# Patient Record
Sex: MALE | Race: WHITE | NOT HISPANIC OR LATINO | Employment: FULL TIME | URBAN - METROPOLITAN AREA
[De-identification: names, ages, dates, MRNs, and addresses within clinical notes are randomized per-mention and may not be internally consistent; named-entity substitution may affect disease eponyms.]

---

## 2017-01-10 RX ORDER — METOPROLOL TARTRATE 50 MG/1
TABLET, FILM COATED ORAL
Qty: 15 TABLET | Refills: 0 | Status: SHIPPED | OUTPATIENT
Start: 2017-01-10 | End: 2017-01-11 | Stop reason: SDUPTHER

## 2017-01-11 RX ORDER — METOPROLOL TARTRATE 50 MG/1
TABLET, FILM COATED ORAL
Qty: 15 TABLET | Refills: 0 | Status: SHIPPED | OUTPATIENT
Start: 2017-01-11 | End: 2017-10-23 | Stop reason: SDUPTHER

## 2017-10-23 ENCOUNTER — OFFICE VISIT (OUTPATIENT)
Dept: FAMILY MEDICINE CLINIC | Facility: CLINIC | Age: 52
End: 2017-10-23

## 2017-10-23 VITALS
HEART RATE: 83 BPM | WEIGHT: 225.6 LBS | OXYGEN SATURATION: 96 % | SYSTOLIC BLOOD PRESSURE: 124 MMHG | DIASTOLIC BLOOD PRESSURE: 82 MMHG | TEMPERATURE: 98.5 F | BODY MASS INDEX: 29.9 KG/M2 | HEIGHT: 73 IN

## 2017-10-23 DIAGNOSIS — Z00.00 ENCOUNTER FOR HEALTH MAINTENANCE EXAMINATION: Primary | ICD-10-CM

## 2017-10-23 DIAGNOSIS — Z13.220 SCREENING FOR LIPID DISORDERS: ICD-10-CM

## 2017-10-23 DIAGNOSIS — Z12.11 SCREEN FOR COLON CANCER: ICD-10-CM

## 2017-10-23 DIAGNOSIS — Z23 NEED FOR PNEUMOCOCCAL VACCINE: ICD-10-CM

## 2017-10-23 DIAGNOSIS — Z13.1 SCREENING FOR DIABETES MELLITUS: ICD-10-CM

## 2017-10-23 DIAGNOSIS — I49.9 IRREGULAR HEARTBEAT: ICD-10-CM

## 2017-10-23 LAB
ALBUMIN SERPL-MCNC: 4.4 G/DL (ref 3.5–5.2)
ALBUMIN/GLOB SERPL: 1.7 G/DL
ALP SERPL-CCNC: 67 U/L (ref 39–117)
ALT SERPL-CCNC: 20 U/L (ref 1–41)
AST SERPL-CCNC: 15 U/L (ref 1–40)
BILIRUB SERPL-MCNC: 0.3 MG/DL (ref 0.1–1.2)
BUN SERPL-MCNC: 14 MG/DL (ref 6–20)
BUN/CREAT SERPL: 11.8 (ref 7–25)
CALCIUM SERPL-MCNC: 9.7 MG/DL (ref 8.6–10.5)
CHLORIDE SERPL-SCNC: 103 MMOL/L (ref 98–107)
CHOLEST SERPL-MCNC: 184 MG/DL (ref 0–200)
CO2 SERPL-SCNC: 25.9 MMOL/L (ref 22–29)
CREAT SERPL-MCNC: 1.19 MG/DL (ref 0.76–1.27)
GFR SERPLBLD CREATININE-BSD FMLA CKD-EPI: 65 ML/MIN/1.73
GFR SERPLBLD CREATININE-BSD FMLA CKD-EPI: 78 ML/MIN/1.73
GLOBULIN SER CALC-MCNC: 2.6 GM/DL
GLUCOSE SERPL-MCNC: 96 MG/DL (ref 65–99)
HBA1C MFR BLD: 6 % (ref 4.8–5.6)
HDLC SERPL-MCNC: 29 MG/DL (ref 40–60)
LDLC SERPL CALC-MCNC: 116 MG/DL (ref 0–100)
POTASSIUM SERPL-SCNC: 4.3 MMOL/L (ref 3.5–5.2)
PROT SERPL-MCNC: 7 G/DL (ref 6–8.5)
SODIUM SERPL-SCNC: 142 MMOL/L (ref 136–145)
TRIGL SERPL-MCNC: 196 MG/DL (ref 0–150)
VLDLC SERPL CALC-MCNC: 39.2 MG/DL (ref 5–40)

## 2017-10-23 PROCEDURE — 90670 PCV13 VACCINE IM: CPT | Performed by: FAMILY MEDICINE

## 2017-10-23 PROCEDURE — 99396 PREV VISIT EST AGE 40-64: CPT | Performed by: FAMILY MEDICINE

## 2017-10-23 PROCEDURE — 90471 IMMUNIZATION ADMIN: CPT | Performed by: FAMILY MEDICINE

## 2017-10-23 RX ORDER — METOPROLOL TARTRATE 50 MG/1
50 TABLET, FILM COATED ORAL DAILY
Qty: 90 TABLET | Refills: 3 | Status: SHIPPED | OUTPATIENT
Start: 2017-10-23 | End: 2018-12-07 | Stop reason: SDUPTHER

## 2017-10-23 NOTE — PROGRESS NOTES
Subjective   Luis Fernando Ramirez Jr. is a 52 y.o. male.     Chief Complaint   Patient presents with   • Establish Care     Patient is wanting to establish primary care Patient is needing his metoprolol refilled        HPI     Patient is here today for a health maintenance exam.  This is his first visit to our office.  Patient has a history of irregular heartbeats for which she takes and tolerates metoprolol 50 mg daily.  He also has a history of asthma for which he uses an albuterol inhaler on an as-needed basis but does not need it quite often.  He does try to maintain healthy diet and exercise regimen.  He participates in jujitsu and judo.  He does smoke one pack of cigarettes a day.  He is not overly interested in quitting at this time.  He does drink 2 alcoholic beverages a week.  Occasional marijuana use.    The following portions of the patient's history were reviewed and updated as appropriate: allergies, current medications, past family history, past medical history, past social history, past surgical history and problem list.    Review of Systems   Constitutional: Negative for fever.   All other systems reviewed and are negative.      Objective  Vitals:    10/23/17 0755   BP: 124/82   Pulse: 83   Temp: 98.5 °F (36.9 °C)   SpO2: 96%        Physical Exam   Constitutional: He is oriented to person, place, and time. He appears well-developed and well-nourished. No distress.   HENT:   Head: Normocephalic and atraumatic.   Right Ear: External ear normal.   Left Ear: External ear normal.   Nose: Nose normal.   Mouth/Throat: Oropharynx is clear and moist.   Eyes: Conjunctivae and EOM are normal. Pupils are equal, round, and reactive to light. Right eye exhibits no discharge. Left eye exhibits no discharge. No scleral icterus.   Neck: Normal range of motion. Neck supple.   Cardiovascular: Normal rate, regular rhythm and normal heart sounds.  Exam reveals no friction rub.    No murmur heard.  Pulmonary/Chest: Effort  normal and breath sounds normal. No respiratory distress. He has no wheezes. He has no rales.   Abdominal: Soft. Bowel sounds are normal. He exhibits no distension. There is no tenderness. There is no rebound and no guarding.   Lymphadenopathy:     He has no cervical adenopathy.   Neurological: He is alert and oriented to person, place, and time.   Skin: Skin is warm and dry. He is not diaphoretic.   Nursing note and vitals reviewed.        Current Outpatient Prescriptions:   •  albuterol (PROVENTIL HFA;VENTOLIN HFA) 108 (90 BASE) MCG/ACT inhaler, Inhale 2 puffs every 4 (four) hours as needed for wheezing., Disp: 1 inhaler, Rfl: 6  •  metoprolol tartrate (LOPRESSOR) 50 MG tablet, Take 1 tablet by mouth Daily., Disp: 90 tablet, Rfl: 3    Procedures    Lab Results (most recent)     None          Assessment/Plan   Luis Fernando was seen today for establish care.    Diagnoses and all orders for this visit:    Encounter for health maintenance examination    Need for pneumococcal vaccine  -     Pneumococcal Conjugate Vaccine 13-Valent All (PCV13)    Screen for colon cancer  -     Amb referral for Screening Colonoscopy    Irregular heartbeat  -     metoprolol tartrate (LOPRESSOR) 50 MG tablet; Take 1 tablet by mouth Daily.    Screening for lipid disorders  -     Lipid Panel    Screening for diabetes mellitus  -     Comprehensive Metabolic Panel  -     Hemoglobin A1c    Preventative health maintenance and screening as above.  We'll adjust treatment plan based on the results.  Refills given.    Return in about 3 months (around 1/23/2018) for Recheck.      Ammon Wood MD

## 2017-10-26 LAB
PSA SERPL-MCNC: 1.28 NG/ML (ref 0–4)
WRITTEN AUTHORIZATION: NORMAL

## 2018-07-13 ENCOUNTER — OFFICE VISIT (OUTPATIENT)
Dept: FAMILY MEDICINE CLINIC | Facility: CLINIC | Age: 53
End: 2018-07-13

## 2018-07-13 VITALS
TEMPERATURE: 98.6 F | HEIGHT: 73 IN | BODY MASS INDEX: 30.35 KG/M2 | HEART RATE: 73 BPM | DIASTOLIC BLOOD PRESSURE: 72 MMHG | OXYGEN SATURATION: 96 % | WEIGHT: 229 LBS | RESPIRATION RATE: 14 BRPM | SYSTOLIC BLOOD PRESSURE: 124 MMHG

## 2018-07-13 DIAGNOSIS — L98.9 SKIN LESION: ICD-10-CM

## 2018-07-13 DIAGNOSIS — I10 HYPERTENSION, UNSPECIFIED TYPE: ICD-10-CM

## 2018-07-13 DIAGNOSIS — Z00.00 ENCOUNTER FOR HEALTH MAINTENANCE EXAMINATION: Primary | ICD-10-CM

## 2018-07-13 DIAGNOSIS — E78.01 FAMILIAL HYPERCHOLESTEROLEMIA: ICD-10-CM

## 2018-07-13 DIAGNOSIS — I49.3 FREQUENT PVCS: ICD-10-CM

## 2018-07-13 DIAGNOSIS — I49.9 IRREGULAR HEARTBEAT: ICD-10-CM

## 2018-07-13 DIAGNOSIS — R73.09 ELEVATED GLUCOSE: ICD-10-CM

## 2018-07-13 PROCEDURE — 99396 PREV VISIT EST AGE 40-64: CPT | Performed by: FAMILY MEDICINE

## 2018-07-13 NOTE — PROGRESS NOTES
Luis Fernando Ramirez Jr. is a 53 y.o. male.     Chief Complaint   Patient presents with   • Annual Exam     Patient needs to get a physical exm.        HPI     Patient presents office today for health maintenance exam.  Patient has a history of hypertension, irregular heartbeat, hyperlipidemia, and elevated glucose levels.  Patient was also noticed recently a lesion on the tip of his nose that he is concerned about.  He would like to be referred to a dermatologist.  Patient does have a cardiologist but he has not seen them in many years.  He states he was not overly impressed with her services.  He states that he feels like his irregular heartbeat and heart issues of gotten slightly worse.  He would like to see a new cardiologist.  Tries to maintain a healthy diet and exercise regimen doing Japanese jujitsu.  No changes in family history.  No changes in social history.    The following portions of the patient's history were reviewed and updated as appropriate: allergies, current medications, past family history, past medical history, past social history, past surgical history and problem list.    Review of Systems   Constitutional: Negative for activity change.   All other systems reviewed and are negative.      Objective  Vitals:    07/13/18 0844   BP: 124/72   Pulse: 73   Resp: 14   Temp: 98.6 °F (37 °C)   SpO2: 96%       Physical Exam   Constitutional: He is oriented to person, place, and time. He appears well-developed and well-nourished. No distress.   HENT:   Head: Normocephalic and atraumatic.       Right Ear: External ear normal.   Left Ear: External ear normal.   Nose: Nose normal.   Mouth/Throat: Oropharynx is clear and moist.   On the tip of his nose there is a raised rough erythematous lesion with obvious vascularity.   Eyes: Conjunctivae and EOM are normal. Pupils are equal, round, and reactive to light. Right eye exhibits no discharge. Left eye exhibits no discharge. No scleral icterus.   Neck: Normal  range of motion. Neck supple.   Cardiovascular: Normal rate and normal heart sounds.  An irregular rhythm present. Exam reveals no friction rub.    No murmur heard.  Pulmonary/Chest: Effort normal and breath sounds normal. No respiratory distress. He has no wheezes. He has no rales.   Abdominal: Soft. Bowel sounds are normal. He exhibits no distension. There is no tenderness. There is no rebound and no guarding.   Lymphadenopathy:     He has no cervical adenopathy.   Neurological: He is alert and oriented to person, place, and time.   Skin: Skin is warm and dry. He is not diaphoretic.   Nursing note and vitals reviewed.        Current Outpatient Prescriptions:   •  albuterol (PROVENTIL HFA;VENTOLIN HFA) 108 (90 BASE) MCG/ACT inhaler, Inhale 2 puffs every 4 (four) hours as needed for wheezing., Disp: 1 inhaler, Rfl: 6  •  metoprolol tartrate (LOPRESSOR) 50 MG tablet, Take 1 tablet by mouth Daily., Disp: 90 tablet, Rfl: 3    Procedures    Lab Results (most recent)     None              Luis Fernando was seen today for annual exam.    Diagnoses and all orders for this visit:    Encounter for health maintenance examination    Hypertension, unspecified type  -     CBC Auto Differential  -     Comprehensive Metabolic Panel  -     Hemoglobin A1c  -     Lipid Panel  -     Thyroid Panel With TSH  -     Ambulatory Referral to Cardiology    Frequent PVCs  -     CBC Auto Differential  -     Comprehensive Metabolic Panel  -     Hemoglobin A1c  -     Lipid Panel  -     Thyroid Panel With TSH  -     Ambulatory Referral to Cardiology    Irregular heartbeat  -     CBC Auto Differential  -     Comprehensive Metabolic Panel  -     Hemoglobin A1c  -     Lipid Panel  -     Thyroid Panel With TSH  -     Ambulatory Referral to Cardiology    Skin lesion  -     CBC Auto Differential  -     Comprehensive Metabolic Panel  -     Hemoglobin A1c  -     Lipid Panel  -     Thyroid Panel With TSH  -     Ambulatory Referral to Dermatology    Familial  hypercholesterolemia  -     CBC Auto Differential  -     Comprehensive Metabolic Panel  -     Hemoglobin A1c  -     Lipid Panel  -     Thyroid Panel With TSH    Elevated glucose  -     CBC Auto Differential  -     Comprehensive Metabolic Panel  -     Hemoglobin A1c  -     Lipid Panel  -     Thyroid Panel With TSH    We'll get blood work as above.  We will refer to a new cardiologist.  We'll refer to dermatology for likely precancerous lesion on the end of his nose.  We'll can indicate results patient when available.      Return for As needed.      Ammon Wood MD

## 2018-07-14 LAB
ALBUMIN SERPL-MCNC: 4.5 G/DL (ref 3.5–5.2)
ALBUMIN/GLOB SERPL: 1.9 G/DL
ALP SERPL-CCNC: 62 U/L (ref 39–117)
ALT SERPL-CCNC: 20 U/L (ref 1–41)
AST SERPL-CCNC: 12 U/L (ref 1–40)
BASOPHILS # BLD AUTO: 0.03 10*3/MM3 (ref 0–0.2)
BASOPHILS NFR BLD AUTO: 0.3 % (ref 0–1.5)
BILIRUB SERPL-MCNC: 0.3 MG/DL (ref 0.1–1.2)
BUN SERPL-MCNC: 15 MG/DL (ref 6–20)
BUN/CREAT SERPL: 12.7 (ref 7–25)
CALCIUM SERPL-MCNC: 9.1 MG/DL (ref 8.6–10.5)
CHLORIDE SERPL-SCNC: 105 MMOL/L (ref 98–107)
CHOLEST SERPL-MCNC: 171 MG/DL (ref 0–200)
CO2 SERPL-SCNC: 25.1 MMOL/L (ref 22–29)
CREAT SERPL-MCNC: 1.18 MG/DL (ref 0.76–1.27)
EOSINOPHIL # BLD AUTO: 0.15 10*3/MM3 (ref 0–0.7)
EOSINOPHIL NFR BLD AUTO: 1.7 % (ref 0.3–6.2)
ERYTHROCYTE [DISTWIDTH] IN BLOOD BY AUTOMATED COUNT: 13.5 % (ref 11.5–14.5)
FT4I SERPL CALC-MCNC: 1.5 (ref 1.2–4.9)
GLOBULIN SER CALC-MCNC: 2.4 GM/DL
GLUCOSE SERPL-MCNC: 100 MG/DL (ref 65–99)
HBA1C MFR BLD: 5.8 % (ref 4.8–5.6)
HCT VFR BLD AUTO: 48.9 % (ref 40.4–52.2)
HDLC SERPL-MCNC: 26 MG/DL (ref 40–60)
HGB BLD-MCNC: 15.7 G/DL (ref 13.7–17.6)
IMM GRANULOCYTES # BLD: 0.03 10*3/MM3 (ref 0–0.03)
IMM GRANULOCYTES NFR BLD: 0.3 % (ref 0–0.5)
LDLC SERPL CALC-MCNC: 92 MG/DL (ref 0–100)
LYMPHOCYTES # BLD AUTO: 3.09 10*3/MM3 (ref 0.9–4.8)
LYMPHOCYTES NFR BLD AUTO: 34.6 % (ref 19.6–45.3)
MCH RBC QN AUTO: 31.7 PG (ref 27–32.7)
MCHC RBC AUTO-ENTMCNC: 32.1 G/DL (ref 32.6–36.4)
MCV RBC AUTO: 98.8 FL (ref 79.8–96.2)
MONOCYTES # BLD AUTO: 0.71 10*3/MM3 (ref 0.2–1.2)
MONOCYTES NFR BLD AUTO: 8 % (ref 5–12)
NEUTROPHILS # BLD AUTO: 4.95 10*3/MM3 (ref 1.9–8.1)
NEUTROPHILS NFR BLD AUTO: 55.4 % (ref 42.7–76)
PLATELET # BLD AUTO: 245 10*3/MM3 (ref 140–500)
POTASSIUM SERPL-SCNC: 4.5 MMOL/L (ref 3.5–5.2)
PROT SERPL-MCNC: 6.9 G/DL (ref 6–8.5)
RBC # BLD AUTO: 4.95 10*6/MM3 (ref 4.6–6)
SODIUM SERPL-SCNC: 143 MMOL/L (ref 136–145)
T3RU NFR SERPL: 25 % (ref 24–39)
T4 SERPL-MCNC: 6 UG/DL (ref 4.5–12)
TRIGL SERPL-MCNC: 267 MG/DL (ref 0–150)
TSH SERPL DL<=0.005 MIU/L-ACNC: 3.01 UIU/ML (ref 0.45–4.5)
VLDLC SERPL CALC-MCNC: 53.4 MG/DL (ref 5–40)
WBC # BLD AUTO: 8.93 10*3/MM3 (ref 4.5–10.7)

## 2018-08-23 RX ORDER — ASPIRIN 81 MG/1
81 TABLET ORAL DAILY
COMMUNITY
End: 2020-02-07

## 2018-08-27 ENCOUNTER — OFFICE VISIT (OUTPATIENT)
Dept: CARDIOLOGY | Facility: CLINIC | Age: 53
End: 2018-08-27

## 2018-08-27 VITALS
HEIGHT: 73 IN | BODY MASS INDEX: 30.09 KG/M2 | DIASTOLIC BLOOD PRESSURE: 90 MMHG | RESPIRATION RATE: 16 BRPM | HEART RATE: 61 BPM | WEIGHT: 227 LBS | SYSTOLIC BLOOD PRESSURE: 128 MMHG

## 2018-08-27 DIAGNOSIS — R07.2 PRECORDIAL PAIN: ICD-10-CM

## 2018-08-27 DIAGNOSIS — R06.09 DYSPNEA ON EXERTION: ICD-10-CM

## 2018-08-27 DIAGNOSIS — I49.3 FREQUENT PVCS: ICD-10-CM

## 2018-08-27 DIAGNOSIS — R94.31 ABNORMAL EKG: ICD-10-CM

## 2018-08-27 DIAGNOSIS — R00.2 PALPITATION: Primary | ICD-10-CM

## 2018-08-27 PROCEDURE — 93000 ELECTROCARDIOGRAM COMPLETE: CPT | Performed by: INTERNAL MEDICINE

## 2018-08-27 PROCEDURE — 99214 OFFICE O/P EST MOD 30 MIN: CPT | Performed by: INTERNAL MEDICINE

## 2018-08-27 NOTE — PROGRESS NOTES
PATIENTINFORMATION    Date of Office Visit: 2018  Encounter Provider: Ester Arrieta MD  Place of Service: [facility] CARDIOLOGY  Patient Name: Luis Fernando Ramirez Jr.  : 1965    Subjective:     Encounter Date:2018      Patient ID: Luis Fernando Ramirez Jr. is a 53 y.o. male.      History of Present Illness    This is a gentleman who previously saw Dr. Vaughan in 2016 with complaints of palpitations.  At that time, he had a Holter monitor, which was unremarkable.  He had an echocardiogram, which showed normal left ventricular function with an ejection fraction of 60% and no valvular heart disease.  He had a nuclear stress test, which showed no ischemia or infarction.  He comes in today because of worsening palpitations.  He said that, since that appointment in , his palpitations have gotten worse.  He says that sometimes they will come on and they will last for days.  He feels absolutely exhausted when this happens.  He feels like it is worse, or starts, when he tries to exercise.  There is no associated chest pain or shortness of breath.  He would average that they are every other day.  He did have an episode of chest pain when he was walking and really hot on vacation in [place].  It lasted about an hour.  It was worse when he moved his arm.  He was diaphoretic but he said it was really hot outside.      Review of Systems   Constitution: Positive for malaise/fatigue. Negative for fever, weight gain and weight loss.   HENT: Negative for ear pain, hearing loss, nosebleeds and sore throat.    Eyes: Negative for double vision, pain, vision loss in left eye and vision loss in right eye.   Cardiovascular:        See history of present illness.   Respiratory: Positive for snoring and wheezing. Negative for cough, shortness of breath and sleep disturbances due to breathing.    Endocrine: Positive for cold intolerance and heat intolerance. Negative for polyuria.  "  Skin: Negative for itching, poor wound healing and rash.   Musculoskeletal: Negative for joint pain, joint swelling and myalgias.   Gastrointestinal: Negative for abdominal pain, diarrhea, hematochezia, nausea and vomiting.   Genitourinary: Negative for hematuria and hesitancy.   Neurological: Negative for numbness, paresthesias and seizures.   Psychiatric/Behavioral: Negative for depression. The patient is nervous/anxious.            ECG 12 Lead  Date/Time: 8/27/2018 11:09 AM  Performed by: MORELIA HALL  Authorized by: MORELIA HALL   Comparison: compared with previous ECG from 7/11/2016  Similar to previous ECG  Rhythm: sinus rhythm  BPM: 61  Conduction: conduction normal  T wave flattening noted on lead: There is T-wave inversion in V4, V5, V6, 2, 3, aVF.  This is unchanged from prior.  Clinical impression: abnormal ECG               Objective:     /90 (BP Location: Right arm, Patient Position: Sitting, Cuff Size: Adult)   Pulse 61   Resp 16   Ht 185.4 cm (73\")   Wt 103 kg (227 lb)   BMI 29.95 kg/m²  Body mass index is 29.95 kg/m².     Physical Exam   Constitutional: He appears well-developed.   HENT:   Head: Normocephalic and atraumatic.   Eyes: Pupils are equal, round, and reactive to light. Conjunctivae and lids are normal. Lids are everted and swept, no foreign bodies found.   Neck: Normal range of motion. No JVD present. Carotid bruit is not present. No tracheal deviation present. No thyroid mass present.   Cardiovascular: Normal rate, regular rhythm and normal heart sounds.    Pulses:       Dorsalis pedis pulses are 2+ on the right side, and 2+ on the left side.   Pulmonary/Chest: Effort normal and breath sounds normal.   Abdominal: Normal appearance and bowel sounds are normal.   Musculoskeletal: Normal range of motion.   Neurological: He is alert. He has normal strength.   Skin: Skin is warm, dry and intact.   Psychiatric: He has a normal mood and affect. His behavior is normal. "   Vitals reviewed.          Assessment/Plan:         Orders Placed This Encounter   Procedures   • Holter Monitor - 72 Hour Up To 21 Days     Zio 14 days     Standing Status:   Future     Standing Expiration Date:   8/27/2019     Order Specific Question:   Reason for exam?     Answer:   Palpitations   • ECG 12 Lead     This order was created via procedure documentation   • Adult Stress Echo W/ Cont or Stress Agent if Necessary Per Protocol     Standing Status:   Future     Standing Expiration Date:   8/27/2019     Order Specific Question:   What stress agent will be used?     Answer:   Exercise with Possible [employer]     Order Specific Question:   Reason for exam?     Answer:   Abnormal EKG   1. Palpitations. He has been told in the past that he has frequent premature ventricular contractions. He thinks it is worse with exertion, so I am going to get him on the treadmill and do a stress echocardiogram. If he does not have any issues on the treadmill and I am unable to make a diagnosis based on that test, I will have him wear a Zio patch. He is already on a decent dose of metoprolol. If he is having a lot of frequent PVCs, I will refer him to Dr. Centeno for possible ablation.   2. Tobacco use. We talked about the importance of quitting smoking.     I will call him to go over the results of his stress echo and the Zio patch when I have them.          Discharge Medications          Accurate as of 8/27/18 12:34 PM. If you have any questions, ask your nurse or doctor.               Continue These Medications      Instructions Start Date   albuterol 108 (90 Base) MCG/ACT inhaler  Commonly known as:  PROVENTIL HFA;VENTOLIN HFA   2 puffs, Inhalation, Every 4 Hours PRN      aspirin 81 MG EC tablet   81 mg, Oral, Daily      metoprolol tartrate 50 MG tablet  Commonly known as:  LOPRESSOR   50 mg, Oral, Daily      MULTIVITAMIN ADULTS PO   Oral, Daily                    Ester Arrieta MD  08/27/18  12:34 PM

## 2018-08-30 ENCOUNTER — TELEPHONE (OUTPATIENT)
Dept: CARDIOLOGY | Facility: CLINIC | Age: 53
End: 2018-08-30

## 2018-08-30 ENCOUNTER — HOSPITAL ENCOUNTER (OUTPATIENT)
Dept: CARDIOLOGY | Facility: HOSPITAL | Age: 53
Discharge: HOME | End: 2018-08-30
Attending: INTERNAL MEDICINE | Admitting: INTERNAL MEDICINE

## 2018-08-30 VITALS
HEIGHT: 73 IN | DIASTOLIC BLOOD PRESSURE: 60 MMHG | WEIGHT: 227 LBS | SYSTOLIC BLOOD PRESSURE: 112 MMHG | HEART RATE: 67 BPM | BODY MASS INDEX: 30.09 KG/M2

## 2018-08-30 DIAGNOSIS — R07.2 PRECORDIAL PAIN: ICD-10-CM

## 2018-08-30 DIAGNOSIS — R94.31 ABNORMAL EKG: ICD-10-CM

## 2018-08-30 DIAGNOSIS — R00.2 PALPITATION: ICD-10-CM

## 2018-08-30 DIAGNOSIS — R06.09 DYSPNEA ON EXERTION: ICD-10-CM

## 2018-08-30 PROCEDURE — 93325 DOPPLER ECHO COLOR FLOW MAPG: CPT | Performed by: INTERNAL MEDICINE

## 2018-08-30 PROCEDURE — 93350 STRESS TTE ONLY: CPT | Performed by: INTERNAL MEDICINE

## 2018-08-30 PROCEDURE — 93320 DOPPLER ECHO COMPLETE: CPT | Performed by: INTERNAL MEDICINE

## 2018-08-30 PROCEDURE — 93325 DOPPLER ECHO COLOR FLOW MAPG: CPT

## 2018-08-30 PROCEDURE — 93017 CV STRESS TEST TRACING ONLY: CPT

## 2018-08-30 PROCEDURE — 93350 STRESS TTE ONLY: CPT

## 2018-08-30 PROCEDURE — 93352 ADMIN ECG CONTRAST AGENT: CPT | Performed by: INTERNAL MEDICINE

## 2018-08-30 PROCEDURE — 25010000002 PERFLUTREN (DEFINITY) 8.476 MG IN SODIUM CHLORIDE 0.9 % 10 ML INJECTION: Performed by: INTERNAL MEDICINE

## 2018-08-30 PROCEDURE — 93320 DOPPLER ECHO COMPLETE: CPT

## 2018-08-30 PROCEDURE — 93016 CV STRESS TEST SUPVJ ONLY: CPT | Performed by: INTERNAL MEDICINE

## 2018-08-30 PROCEDURE — 93018 CV STRESS TEST I&R ONLY: CPT | Performed by: INTERNAL MEDICINE

## 2018-08-30 RX ADMIN — PERFLUTREN 3 ML: 6.52 INJECTION, SUSPENSION INTRAVENOUS at 09:26

## 2018-08-30 NOTE — TELEPHONE ENCOUNTER
I called and spoke with him.  He had a normal stress echo.  He declined the Zio patch today because it the last week in the pool is open and he doesn't want to miss out on that with his kids.  He has an appointment in 2 weeks.  Is there any way we can get that moved sooner?

## 2018-09-24 ENCOUNTER — TELEPHONE (OUTPATIENT)
Dept: CARDIOLOGY | Facility: CLINIC | Age: 53
End: 2018-09-24

## 2018-09-24 NOTE — TELEPHONE ENCOUNTER
I left a message and let him know that all we saw on the monitor with his premature atrial contractions and occasional premature ventricular contractions.  No need for referral to Dr. Hope At this time. Call back if questions

## 2018-12-06 ENCOUNTER — TELEPHONE (OUTPATIENT)
Dept: FAMILY MEDICINE CLINIC | Facility: CLINIC | Age: 53
End: 2018-12-06

## 2018-12-06 NOTE — TELEPHONE ENCOUNTER
Patient is requesting a refill on Metoprolol 50mg teake one tablet by mouth daily. Sent to [facility]. Please call him and let him know once this has been done.

## 2018-12-07 DIAGNOSIS — I49.9 IRREGULAR HEARTBEAT: ICD-10-CM

## 2018-12-07 RX ORDER — METOPROLOL TARTRATE 50 MG/1
50 TABLET, FILM COATED ORAL DAILY
Qty: 90 TABLET | Refills: 3 | Status: SHIPPED | OUTPATIENT
Start: 2018-12-07 | End: 2020-02-07

## 2018-12-07 NOTE — TELEPHONE ENCOUNTER
Patient was calling again regarding this because he is going out of town tomorrow and needs this refilled before.

## 2019-04-15 ENCOUNTER — OFFICE VISIT (OUTPATIENT)
Dept: FAMILY MEDICINE CLINIC | Facility: CLINIC | Age: 54
End: 2019-04-15

## 2019-04-15 VITALS
TEMPERATURE: 98.3 F | BODY MASS INDEX: 36.71 KG/M2 | OXYGEN SATURATION: 98 % | HEIGHT: 73 IN | SYSTOLIC BLOOD PRESSURE: 124 MMHG | DIASTOLIC BLOOD PRESSURE: 60 MMHG | HEART RATE: 85 BPM | WEIGHT: 277 LBS | RESPIRATION RATE: 14 BRPM

## 2019-04-15 DIAGNOSIS — Z00.00 ENCOUNTER FOR HEALTH MAINTENANCE EXAMINATION: Primary | ICD-10-CM

## 2019-04-15 DIAGNOSIS — Z12.12 SCREENING FOR COLORECTAL CANCER: ICD-10-CM

## 2019-04-15 DIAGNOSIS — Z12.11 SCREENING FOR COLORECTAL CANCER: ICD-10-CM

## 2019-04-15 DIAGNOSIS — E78.01 FAMILIAL HYPERCHOLESTEROLEMIA: ICD-10-CM

## 2019-04-15 DIAGNOSIS — Z12.5 SCREENING FOR PROSTATE CANCER: ICD-10-CM

## 2019-04-15 DIAGNOSIS — I10 HYPERTENSION, UNSPECIFIED TYPE: ICD-10-CM

## 2019-04-15 DIAGNOSIS — Z13.1 SCREENING FOR DIABETES MELLITUS: ICD-10-CM

## 2019-04-15 PROCEDURE — 99396 PREV VISIT EST AGE 40-64: CPT | Performed by: FAMILY MEDICINE

## 2019-04-15 NOTE — PROGRESS NOTES
Luis Fernando Ramirez Jr. is a 54 y.o. male.     Chief Complaint   Patient presents with   • Annual Exam     patient needs a physical exam.   • Palpitations     patient is still having skipped heart beats however he is on Metoprolol.   • Tinnitus     patient is having some ringing in his ears recently.       HPI     Patient presents the office today for health maintenance exam.  Has a history of hypertension and hyperlipidemia.  He also has a history of heart palpitations.  Does see cardiology.  Taking metoprolol.  Tolerating well.  Maintains a healthy diet and exercise regimen he does smoke.  No changes in family history.    The following portions of the patient's history were reviewed and updated as appropriate: allergies, current medications, past family history, past medical history, past social history, past surgical history and problem list.    Review of Systems   Cardiovascular: Positive for palpitations.   All other systems reviewed and are negative.      Objective  Vitals:    04/15/19 0833   BP: 124/60   Pulse: 85   Resp: 14   Temp: 98.3 °F (36.8 °C)   SpO2: 98%       Physical Exam   Constitutional: He is oriented to person, place, and time. He appears well-developed and well-nourished. No distress.   HENT:   Head: Normocephalic and atraumatic.   Right Ear: External ear normal.   Left Ear: External ear normal.   Nose: Nose normal.   Mouth/Throat: Oropharynx is clear and moist.   Eyes: Conjunctivae and EOM are normal. Pupils are equal, round, and reactive to light. Right eye exhibits no discharge. Left eye exhibits no discharge. No scleral icterus.   Neck: Normal range of motion. Neck supple.   Cardiovascular: Normal rate, regular rhythm and normal heart sounds. Exam reveals no friction rub.   No murmur heard.  Pulmonary/Chest: Effort normal and breath sounds normal. No respiratory distress. He has no wheezes. He has no rales.   Abdominal: Soft. Bowel sounds are normal. He exhibits no distension. There is no  tenderness. There is no rebound and no guarding.   Lymphadenopathy:     He has no cervical adenopathy.   Neurological: He is alert and oriented to person, place, and time.   Skin: Skin is warm and dry. He is not diaphoretic.   Nursing note and vitals reviewed.        Current Outpatient Medications:   •  metoprolol tartrate (LOPRESSOR) 50 MG tablet, Take 1 tablet by mouth Daily., Disp: 90 tablet, Rfl: 3  •  Multiple Vitamins-Minerals (MULTIVITAMIN ADULTS PO), Take  by mouth Daily., Disp: , Rfl:   •  albuterol (PROVENTIL HFA;VENTOLIN HFA) 108 (90 BASE) MCG/ACT inhaler, Inhale 2 puffs every 4 (four) hours as needed for wheezing., Disp: 1 inhaler, Rfl: 6  •  aspirin 81 MG EC tablet, Take 81 mg by mouth Daily., Disp: , Rfl:   Current outpatient and discharge medications have been reconciled for the patient.  Reviewed by: Ammon Wood MD      Procedures    Lab Results (most recent)     None                  Luis Fernando was seen today for annual exam, palpitations and tinnitus.    Diagnoses and all orders for this visit:    Encounter for health maintenance examination    Familial hypercholesterolemia  -     Lipid Panel    Hypertension, unspecified type  -     Comprehensive Metabolic Panel    Screening for diabetes mellitus  -     Comprehensive Metabolic Panel  -     Hemoglobin A1c    Screening for prostate cancer  -     PSA Screen    Screening for colorectal cancer  -     Ambulatory Referral For Screening Colonoscopy    Preventative health maintenance and screening as above.  Will get labs as above and communicate results to patient when available.  5 minutes was spent discussing smoking cessation strategies.  Advised continued use of diet and exercise.      Return for As needed.      Amomn Wood MD

## 2019-04-16 LAB
ALBUMIN SERPL-MCNC: 4.6 G/DL (ref 3.5–5.2)
ALBUMIN/GLOB SERPL: 1.8 G/DL
ALP SERPL-CCNC: 64 U/L (ref 39–117)
ALT SERPL-CCNC: 19 U/L (ref 1–41)
AST SERPL-CCNC: 13 U/L (ref 1–40)
BILIRUB SERPL-MCNC: 0.5 MG/DL (ref 0.2–1.2)
BUN SERPL-MCNC: 13 MG/DL (ref 6–20)
BUN/CREAT SERPL: 10.9 (ref 7–25)
CALCIUM SERPL-MCNC: 9.7 MG/DL (ref 8.6–10.5)
CHLORIDE SERPL-SCNC: 106 MMOL/L (ref 98–107)
CHOLEST SERPL-MCNC: 193 MG/DL (ref 0–200)
CO2 SERPL-SCNC: 24.7 MMOL/L (ref 22–29)
CREAT SERPL-MCNC: 1.19 MG/DL (ref 0.76–1.27)
GLOBULIN SER CALC-MCNC: 2.5 GM/DL
GLUCOSE SERPL-MCNC: 110 MG/DL (ref 65–99)
HBA1C MFR BLD: 5.7 % (ref 4.8–5.6)
HDLC SERPL-MCNC: 27 MG/DL (ref 40–60)
LDLC SERPL CALC-MCNC: 126 MG/DL (ref 0–100)
POTASSIUM SERPL-SCNC: 4.3 MMOL/L (ref 3.5–5.2)
PROT SERPL-MCNC: 7.1 G/DL (ref 6–8.5)
PSA SERPL-MCNC: 0.92 NG/ML (ref 0–4)
SODIUM SERPL-SCNC: 143 MMOL/L (ref 136–145)
TRIGL SERPL-MCNC: 200 MG/DL (ref 0–150)
VLDLC SERPL CALC-MCNC: 40 MG/DL

## 2020-02-07 ENCOUNTER — OFFICE VISIT (OUTPATIENT)
Dept: FAMILY MEDICINE CLINIC | Facility: CLINIC | Age: 55
End: 2020-02-07

## 2020-02-07 VITALS
BODY MASS INDEX: 30.35 KG/M2 | HEIGHT: 73 IN | SYSTOLIC BLOOD PRESSURE: 128 MMHG | WEIGHT: 229 LBS | TEMPERATURE: 98.1 F | HEART RATE: 87 BPM | OXYGEN SATURATION: 98 % | DIASTOLIC BLOOD PRESSURE: 72 MMHG | RESPIRATION RATE: 16 BRPM

## 2020-02-07 DIAGNOSIS — E78.01 FAMILIAL HYPERCHOLESTEROLEMIA: ICD-10-CM

## 2020-02-07 DIAGNOSIS — Z12.5 SCREENING FOR PROSTATE CANCER: ICD-10-CM

## 2020-02-07 DIAGNOSIS — Z00.00 ENCOUNTER FOR HEALTH MAINTENANCE EXAMINATION: Primary | ICD-10-CM

## 2020-02-07 DIAGNOSIS — R07.9 CHEST PAIN, UNSPECIFIED TYPE: ICD-10-CM

## 2020-02-07 DIAGNOSIS — Z13.1 SCREENING FOR DIABETES MELLITUS: ICD-10-CM

## 2020-02-07 DIAGNOSIS — Z13.29 SCREENING FOR THYROID DISORDER: ICD-10-CM

## 2020-02-07 PROCEDURE — 99396 PREV VISIT EST AGE 40-64: CPT | Performed by: FAMILY MEDICINE

## 2020-02-07 NOTE — PROGRESS NOTES
Luis Fernando Ramirez Jr. is a 54 y.o. male.     Chief Complaint   Patient presents with   • Annual Exam     patient needs physical exam, he is not fasting for blood work.       HPI     Patient presents the office today for health maintenance exam.  States that he is been having some episodes with exertion of some chest tightness.  Does see cardiology has history of hyperlipidemia.  Does not take any medications.  Tries to maintain healthy diet and exercise.  Does smoke about 1 pack of cigarettes per day.  No interest in quitting at this time.  No significant changes in family history of illness.    The following portions of the patient's history were reviewed and updated as appropriate: allergies, current medications, past family history, past medical history, past social history, past surgical history and problem list.    Review of Systems   Constitutional: Negative for activity change.   All other systems reviewed and are negative.    I have reviewed the ROS as documented by the MA. Ammon Wood MD    Objective  Vitals:    02/07/20 1045   BP: 128/72   Pulse: 87   Resp: 16   Temp: 98.1 °F (36.7 °C)   SpO2: 98%     Body mass index is 30.21 kg/m².    Physical Exam   Constitutional: He is oriented to person, place, and time. He appears well-developed and well-nourished. No distress.   HENT:   Head: Normocephalic and atraumatic.   Right Ear: External ear normal.   Left Ear: External ear normal.   Nose: Nose normal.   Mouth/Throat: Oropharynx is clear and moist.   Eyes: Pupils are equal, round, and reactive to light. Conjunctivae and EOM are normal. Right eye exhibits no discharge. Left eye exhibits no discharge. No scleral icterus.   Neck: Normal range of motion. Neck supple.   Cardiovascular: Normal rate, regular rhythm and normal heart sounds. Exam reveals no friction rub.   No murmur heard.  Pulmonary/Chest: Effort normal and breath sounds normal. No respiratory distress. He has no wheezes. He has no rales.    Abdominal: Soft. Bowel sounds are normal. He exhibits no distension. There is no tenderness. There is no rebound and no guarding.   Lymphadenopathy:     He has no cervical adenopathy.   Neurological: He is alert and oriented to person, place, and time.   Skin: Skin is warm and dry. He is not diaphoretic.   Nursing note and vitals reviewed.        Current Outpatient Medications:   •  Multiple Vitamins-Minerals (MULTIVITAMIN ADULTS PO), Take  by mouth Daily., Disp: , Rfl:   •  albuterol (PROVENTIL HFA;VENTOLIN HFA) 108 (90 BASE) MCG/ACT inhaler, Inhale 2 puffs every 4 (four) hours as needed for wheezing., Disp: 1 inhaler, Rfl: 6  Current outpatient and discharge medications have been reconciled for the patient.  Reviewed by: Ammon Wood MD      Procedures    Lab Results (most recent)     None                  Luis Fernando was seen today for annual exam.    Diagnoses and all orders for this visit:    Encounter for health maintenance examination    Chest pain, unspecified type  -     Comprehensive Metabolic Panel  -     Lipid Panel  -     Hemoglobin A1c  -     Thyroid Panel With TSH  -     PSA Screen  -     Treadmill Stress Test; Future  -     CBC Auto Differential    Familial hypercholesterolemia  -     Comprehensive Metabolic Panel  -     Lipid Panel  -     Hemoglobin A1c  -     Thyroid Panel With TSH  -     PSA Screen  -     Treadmill Stress Test; Future  -     CBC Auto Differential    Screening for thyroid disorder  -     Comprehensive Metabolic Panel  -     Lipid Panel  -     Hemoglobin A1c  -     Thyroid Panel With TSH  -     PSA Screen  -     Treadmill Stress Test; Future  -     CBC Auto Differential    Screening for diabetes mellitus  -     Comprehensive Metabolic Panel  -     Lipid Panel  -     Hemoglobin A1c  -     Thyroid Panel With TSH  -     PSA Screen  -     Treadmill Stress Test; Future  -     CBC Auto Differential    Screening for prostate cancer  -     Comprehensive Metabolic Panel  -     Lipid Panel  -      Hemoglobin A1c  -     Thyroid Panel With TSH  -     PSA Screen  -     Treadmill Stress Test; Future  -     CBC Auto Differential    Preventative health maintenance and screening as above.  Will get a treadmill stress test.  Discussed concerning signs and symptoms and reasons for an ER visit.  Advised improvements in diet and exercise.      Return for As needed.      Ammon Wood MD

## 2020-02-10 LAB
ALBUMIN SERPL-MCNC: 4.5 G/DL (ref 3.5–5.2)
ALBUMIN/GLOB SERPL: 2 G/DL
ALP SERPL-CCNC: 67 U/L (ref 39–117)
ALT SERPL-CCNC: 23 U/L (ref 1–41)
AST SERPL-CCNC: 12 U/L (ref 1–40)
BASOPHILS # BLD AUTO: 0.05 10*3/MM3 (ref 0–0.2)
BASOPHILS NFR BLD AUTO: 0.7 % (ref 0–1.5)
BILIRUB SERPL-MCNC: 0.3 MG/DL (ref 0.2–1.2)
BUN SERPL-MCNC: 13 MG/DL (ref 6–20)
BUN/CREAT SERPL: 12.4 (ref 7–25)
CALCIUM SERPL-MCNC: 9.3 MG/DL (ref 8.6–10.5)
CHLORIDE SERPL-SCNC: 104 MMOL/L (ref 98–107)
CHOLEST SERPL-MCNC: 193 MG/DL (ref 0–200)
CO2 SERPL-SCNC: 25.3 MMOL/L (ref 22–29)
CREAT SERPL-MCNC: 1.05 MG/DL (ref 0.76–1.27)
EOSINOPHIL # BLD AUTO: 0.13 10*3/MM3 (ref 0–0.4)
EOSINOPHIL NFR BLD AUTO: 1.7 % (ref 0.3–6.2)
ERYTHROCYTE [DISTWIDTH] IN BLOOD BY AUTOMATED COUNT: 12.7 % (ref 12.3–15.4)
FT4I SERPL CALC-MCNC: 1.9 (ref 1.2–4.9)
GLOBULIN SER CALC-MCNC: 2.2 GM/DL
GLUCOSE SERPL-MCNC: 102 MG/DL (ref 65–99)
HBA1C MFR BLD: 6.2 % (ref 4.8–5.6)
HCT VFR BLD AUTO: 45.1 % (ref 37.5–51)
HDLC SERPL-MCNC: 24 MG/DL (ref 40–60)
HGB BLD-MCNC: 15.7 G/DL (ref 13–17.7)
IMM GRANULOCYTES # BLD AUTO: 0.03 10*3/MM3 (ref 0–0.05)
IMM GRANULOCYTES NFR BLD AUTO: 0.4 % (ref 0–0.5)
LDLC SERPL CALC-MCNC: ABNORMAL MG/DL
LYMPHOCYTES # BLD AUTO: 2.56 10*3/MM3 (ref 0.7–3.1)
LYMPHOCYTES NFR BLD AUTO: 33.9 % (ref 19.6–45.3)
MCH RBC QN AUTO: 31.3 PG (ref 26.6–33)
MCHC RBC AUTO-ENTMCNC: 34.8 G/DL (ref 31.5–35.7)
MCV RBC AUTO: 89.8 FL (ref 79–97)
MONOCYTES # BLD AUTO: 0.67 10*3/MM3 (ref 0.1–0.9)
MONOCYTES NFR BLD AUTO: 8.9 % (ref 5–12)
NEUTROPHILS # BLD AUTO: 4.12 10*3/MM3 (ref 1.7–7)
NEUTROPHILS NFR BLD AUTO: 54.4 % (ref 42.7–76)
NRBC BLD AUTO-RTO: 0 /100 WBC (ref 0–0.2)
PLATELET # BLD AUTO: 247 10*3/MM3 (ref 140–450)
POTASSIUM SERPL-SCNC: 4.2 MMOL/L (ref 3.5–5.2)
PROT SERPL-MCNC: 6.7 G/DL (ref 6–8.5)
PSA SERPL-MCNC: 0.76 NG/ML (ref 0–4)
RBC # BLD AUTO: 5.02 10*6/MM3 (ref 4.14–5.8)
SODIUM SERPL-SCNC: 143 MMOL/L (ref 136–145)
T3RU NFR SERPL: 25 % (ref 24–39)
T4 SERPL-MCNC: 7.4 UG/DL (ref 4.5–12)
TRIGL SERPL-MCNC: 404 MG/DL (ref 0–150)
TSH SERPL DL<=0.005 MIU/L-ACNC: 2.29 UIU/ML (ref 0.45–4.5)
VLDLC SERPL CALC-MCNC: ABNORMAL MG/DL
WBC # BLD AUTO: 7.56 10*3/MM3 (ref 3.4–10.8)

## 2020-02-11 ENCOUNTER — TELEPHONE (OUTPATIENT)
Dept: CARDIOLOGY | Facility: CLINIC | Age: 55
End: 2020-02-11

## 2020-02-11 NOTE — TELEPHONE ENCOUNTER
Pt was called after leaving a , he explained that he had a test coming up and explained that he was experiencing symptoms. I told him that I'd need to have him explain his symptoms to a triange nurse so that they can decide what the next best course of action should be. Pt disconnected the call after telling me he'd find another cardiologist.          Thank You,  Isac NAIK

## 2020-02-21 ENCOUNTER — HOSPITAL ENCOUNTER (OUTPATIENT)
Dept: CARDIOLOGY | Facility: HOSPITAL | Age: 55
Discharge: HOME | End: 2020-02-21
Admitting: FAMILY MEDICINE

## 2020-02-21 DIAGNOSIS — Z13.29 SCREENING FOR THYROID DISORDER: ICD-10-CM

## 2020-02-21 DIAGNOSIS — R07.9 CHEST PAIN, UNSPECIFIED TYPE: ICD-10-CM

## 2020-02-21 DIAGNOSIS — E78.01 FAMILIAL HYPERCHOLESTEROLEMIA: ICD-10-CM

## 2020-02-21 DIAGNOSIS — Z12.5 SCREENING FOR PROSTATE CANCER: ICD-10-CM

## 2020-02-21 DIAGNOSIS — Z13.1 SCREENING FOR DIABETES MELLITUS: ICD-10-CM

## 2020-02-21 LAB
BH CV STRESS BP STAGE 1: NORMAL
BH CV STRESS BP STAGE 2: NORMAL
BH CV STRESS BP STAGE 3: NORMAL
BH CV STRESS DURATION MIN STAGE 1: 3
BH CV STRESS DURATION MIN STAGE 2: 3
BH CV STRESS DURATION MIN STAGE 3: 0
BH CV STRESS DURATION SEC STAGE 1: 0
BH CV STRESS DURATION SEC STAGE 2: 0
BH CV STRESS DURATION SEC STAGE 3: 45
BH CV STRESS GRADE STAGE 1: 10
BH CV STRESS GRADE STAGE 2: 12
BH CV STRESS GRADE STAGE 3: 14
BH CV STRESS HR STAGE 1: 115
BH CV STRESS HR STAGE 2: 135
BH CV STRESS HR STAGE 3: 145
BH CV STRESS METS STAGE 1: 5
BH CV STRESS METS STAGE 2: 7.5
BH CV STRESS METS STAGE 3: 10
BH CV STRESS PROTOCOL 1: NORMAL
BH CV STRESS RECOVERY BP: NORMAL MMHG
BH CV STRESS RECOVERY HR: 96 BPM
BH CV STRESS SPEED STAGE 1: 1.7
BH CV STRESS SPEED STAGE 2: 2.5
BH CV STRESS SPEED STAGE 3: 3.4
BH CV STRESS STAGE 1: 1
BH CV STRESS STAGE 2: 2
BH CV STRESS STAGE 3: 3
MAXIMAL PREDICTED HEART RATE: 168 BPM
PERCENT MAX PREDICTED HR: 86.31 %
STRESS BASELINE BP: NORMAL MMHG
STRESS BASELINE HR: 77 BPM
STRESS PERCENT HR: 102 %
STRESS POST ESTIMATED WORKLOAD: 8.2 METS
STRESS POST EXERCISE DUR MIN: 6 MIN
STRESS POST EXERCISE DUR SEC: 45 SEC
STRESS POST PEAK BP: NORMAL MMHG
STRESS POST PEAK HR: 145 BPM
STRESS TARGET HR: 143 BPM

## 2020-02-21 PROCEDURE — 93018 CV STRESS TEST I&R ONLY: CPT | Performed by: INTERNAL MEDICINE

## 2020-02-21 PROCEDURE — 93016 CV STRESS TEST SUPVJ ONLY: CPT | Performed by: INTERNAL MEDICINE

## 2020-02-21 PROCEDURE — 93017 CV STRESS TEST TRACING ONLY: CPT

## 2020-02-27 ENCOUNTER — TELEPHONE (OUTPATIENT)
Dept: FAMILY MEDICINE CLINIC | Facility: CLINIC | Age: 55
End: 2020-02-27

## 2020-02-27 NOTE — TELEPHONE ENCOUNTER
----- Message from Luis Fernando Ramirez Jr. sent at 2/26/2020  8:29 PM EST -----  Regarding: Test Results Question  Contact: [phone]  I have a question about TREADMILL STRESS TEST resulted on 2/21/20, 2:16 PM.    Just wondering if I'm supposed to call, or if someone is going to call me.    - Miki

## 2020-03-02 NOTE — PROGRESS NOTES
Date of Office Visit: 2020  Encounter Provider: RYLEE Brown  Place of Service: [facility] CARDIOLOGY  Patient Name: Luis Fernando Ramirez Jr.  :1965    Chief Complaint   Patient presents with   • Chest Pain   :     HPI: Luis Fernando Ramirez is a 54-year-old male who is a previous patient of Dr. Vaughan in the past.  She currently sees Dr. Arrieta.  He is new to me today.  He had come to see us in 2016 with complaints of palpitations he had an unremarkable Holter and echo at that time.  He also had a stress echo that was negative.  He came to see 2018 because he states his palpitations had gotten worse.  They were worse when he tried exercise.  He had an episode of chest pain while in vacation in [place] that lasted about an hour.  He wore a Holter monitor for 72 hours that showed some atrial bigeminy and occasional PVCs.  They corresponded with the patient symptoms.  He had a stress echo at that time that was unremarkable.  Patient's family doctor had ordered a exercise treadmill with us which was done on .  He had a 1 mm upsloping ST depression with exertion.  He was also hypertensive during the test his resting blood pressure was 143/80 and his maximum blood pressure was 191/73.    He states a few weeks ago he was at a work conference and he was waiting for his turn to introduce himself and his heart was pounding and he had some tightness in the chest.  It went away after few minutes.  Then he was helping his son move some furniture and he had some tightness in the chest.  During the stress test that he had on the treadmill he did not have any pain in his chest.  He is not had any shortness of breath.  He has risk factors such as tobacco abuse.  He no longer takes metoprolol because he quit having palpitations.  That is been about a year now.  Recently his cholesterol was elevated and his triglycerides were 400.  He agreed to try diet and  exercise to help improve his numbers.      Past Medical History:   Diagnosis Date   • Asthma    • Chest pain    • Familial hypercholesterolemia    • Hypertension    • Irregular heart beats    • PVC (premature ventricular contraction)        History reviewed. No pertinent surgical history.    Social History     Socioeconomic History   • Marital status:      Spouse name: Not on file   • Number of children: Not on file   • Years of education: Not on file   • Highest education level: Not on file   Tobacco Use   • Smoking status: Current Every Day Smoker     Start date: 1987   • Smokeless tobacco: Current User   Substance and Sexual Activity   • Alcohol use: Yes     Alcohol/week: 1.0 standard drinks     Types: 1 Cans of beer per week     Comment: daily caffiene   • Drug use: Yes     Types: Marijuana     Comment: Once monthly   • Sexual activity: Defer       Family History   Problem Relation Age of Onset   • Heart attack Maternal Grandfather    • Heart disease Father        Review of Systems   Constitution: Negative for diaphoresis and malaise/fatigue.   Cardiovascular: Positive for chest pain. Negative for claudication, dyspnea on exertion, irregular heartbeat, leg swelling, near-syncope, orthopnea, palpitations, paroxysmal nocturnal dyspnea and syncope.   Respiratory: Negative for cough, shortness of breath and sleep disturbances due to breathing.    Musculoskeletal: Negative for falls.   Neurological: Negative for dizziness and weakness.   Psychiatric/Behavioral: Negative for altered mental status and substance abuse.       No Known Allergies      Current Outpatient Medications:   •  aspirin 81 MG tablet, Take 1 tablet by mouth Daily., Disp: 30 tablet, Rfl: 11  •  lisinopril (PRINIVIL,ZESTRIL) 10 MG tablet, Take 1 tablet by mouth Daily., Disp: 30 tablet, Rfl: 11      Objective:     Vitals:    03/03/20 0940   BP: 130/80   BP Location: Right arm   Patient Position: Sitting   Pulse: 89   Resp: 16   SpO2: 97%    "  Weight: 106 kg (234 lb 3.2 oz)   Height: 185.4 cm (73\")     Body mass index is 30.9 kg/m².    PHYSICAL EXAM:    Physical Exam   Constitutional: He is oriented to person, place, and time. He appears well-developed and well-nourished. No distress.   HENT:   Head: Normocephalic.   Eyes: Pupils are equal, round, and reactive to light. EOM are normal.   Neck: Normal range of motion. Neck supple. No JVD present. Carotid bruit is not present. No edema present.   Cardiovascular: Normal rate, regular rhythm, S1 normal, S2 normal, normal heart sounds and intact distal pulses. Exam reveals no gallop.   No murmur heard.  Pulmonary/Chest: Effort normal and breath sounds normal. No tachypnea. He has no wheezes. He has no rales.   Abdominal: Soft. Normal appearance and bowel sounds are normal. He exhibits no ascites. There is no tenderness.   Musculoskeletal: Normal range of motion. He exhibits no edema.   Neurological: He is alert and oriented to person, place, and time.   Skin: Skin is warm and dry.   Psychiatric: He has a normal mood and affect.         ECG 12 Lead  Date/Time: 3/3/2020 10:08 AM  Performed by: Jolie Ch APRN  Authorized by: Jolie Ch APRN   Comparison: compared with previous ECG from 8/27/2018  Similar to previous ECG  Rhythm: sinus rhythm  Rate: normal  T inversion: II, III, aVF, V3, V4, V5 and V6  QRS axis: normal    Clinical impression: abnormal EKG              Assessment:       Diagnosis Plan   1. Chest pain, atypical  Stress Test With Myocardial Perfusion One Day   2. Abnormal EKG  Stress Test With Myocardial Perfusion One Day   3. Essential hypertension  lisinopril (PRINIVIL,ZESTRIL) 10 MG tablet   4. High blood triglycerides     5. Familial hypercholesterolemia       Orders Placed This Encounter   Procedures   • Stress Test With Myocardial Perfusion One Day     Standing Status:   Future     Standing Expiration Date:   3/3/2021     Order Specific Question:   What stress agent will be " used?     Answer:   Exercise with possible pharmacologic     Order Specific Question:   Reason for exam?     Answer:   Chest Pain   • ECG 12 Lead     This order was created via procedure documentation          Plan:       Again add some lisinopril to his medication regimen I think some of his symptoms could be from hypertension.  We will start lisinopril 10 mg daily.  I have also instructed him to take a baby aspirin.  He has some T wave inversions in the inferior lateral leads which is where his ST depression was during the stress testing.  I am getting go ahead and repeat the test with nuclear imaging if this is abnormal then consider coronary angiography if not he should modify his risk factors with control of blood pressure and stopping smoking.    Luis Fernando COPELAND James Paula.  reports that he has been smoking. He started smoking about 33 years ago. He uses smokeless tobacco.. I have educated him on the risk of diseases from using tobacco products such as cancer, COPD and heart diease.     I advised him to quit and he is willing to quit. We have discussed the following method/s for tobacco cessation:  Counseling.  Together we have set a quit date for 2 weeks from today.  He will follow up with me in a few week or sooner to check on his progress.    I spent 5 minutes counseling the patient.              Your medication list           Accurate as of March 3, 2020 10:09 AM. If you have any questions, ask your nurse or doctor.               START taking these medications      Instructions Last Dose Given Next Dose Due   lisinopril 10 MG tablet  Commonly known as:  PRINIVIL,ZESTRIL  Started by:  RYLEE Brown      Take 1 tablet by mouth Daily.          CONTINUE taking these medications      Instructions Last Dose Given Next Dose Due   aspirin 81 MG tablet      Take 1 tablet by mouth Daily.          STOP taking these medications    albuterol sulfate  (90 Base) MCG/ACT inhaler  Commonly known as:  PROVENTIL  HFA;VENTOLIN HFA;PROAIR HFA  Stopped by:  RYLEE Brown        MULTIVITAMIN ADULTS PO  Stopped by:  RYLEE Brown              Where to Get Your Medications      These medications were sent to [address] - [address] [facility]. - [phone]  - [phone]   [address] [place] [address]    Phone:  [phone]   · lisinopril 10 MG tablet           As always, it has been a pleasure to participate in your patient's care.      Sincerely,     Jolie MCKEE

## 2020-03-03 ENCOUNTER — OFFICE VISIT (OUTPATIENT)
Dept: CARDIOLOGY | Facility: CLINIC | Age: 55
End: 2020-03-03

## 2020-03-03 VITALS
SYSTOLIC BLOOD PRESSURE: 130 MMHG | WEIGHT: 234.2 LBS | RESPIRATION RATE: 16 BRPM | HEART RATE: 89 BPM | HEIGHT: 73 IN | OXYGEN SATURATION: 97 % | DIASTOLIC BLOOD PRESSURE: 80 MMHG | BODY MASS INDEX: 31.04 KG/M2

## 2020-03-03 DIAGNOSIS — I10 ESSENTIAL HYPERTENSION: ICD-10-CM

## 2020-03-03 DIAGNOSIS — R07.89 CHEST PAIN, ATYPICAL: Primary | ICD-10-CM

## 2020-03-03 DIAGNOSIS — E78.1 HIGH BLOOD TRIGLYCERIDES: ICD-10-CM

## 2020-03-03 DIAGNOSIS — E78.01 FAMILIAL HYPERCHOLESTEROLEMIA: ICD-10-CM

## 2020-03-03 DIAGNOSIS — R94.31 ABNORMAL EKG: ICD-10-CM

## 2020-03-03 PROCEDURE — 99214 OFFICE O/P EST MOD 30 MIN: CPT | Performed by: NURSE PRACTITIONER

## 2020-03-03 PROCEDURE — 93000 ELECTROCARDIOGRAM COMPLETE: CPT | Performed by: NURSE PRACTITIONER

## 2020-03-03 RX ORDER — LISINOPRIL 10 MG/1
10 TABLET ORAL DAILY
Qty: 30 TABLET | Refills: 11 | Status: SHIPPED | OUTPATIENT
Start: 2020-03-03 | End: 2020-09-30

## 2020-03-12 ENCOUNTER — HOSPITAL ENCOUNTER (OUTPATIENT)
Dept: CARDIOLOGY | Facility: HOSPITAL | Age: 55
Discharge: HOME | End: 2020-03-12
Admitting: NURSE PRACTITIONER

## 2020-03-12 ENCOUNTER — TELEPHONE (OUTPATIENT)
Dept: CARDIOLOGY | Facility: CLINIC | Age: 55
End: 2020-03-12

## 2020-03-12 VITALS — BODY MASS INDEX: 30.97 KG/M2 | WEIGHT: 233.69 LBS | HEIGHT: 73 IN

## 2020-03-12 DIAGNOSIS — R94.31 ABNORMAL EKG: ICD-10-CM

## 2020-03-12 DIAGNOSIS — R07.89 CHEST PAIN, ATYPICAL: ICD-10-CM

## 2020-03-12 LAB
BH CV NUCLEAR PRIOR STUDY: 2
BH CV STRESS BP STAGE 1: NORMAL
BH CV STRESS BP STAGE 2: NORMAL
BH CV STRESS BP STAGE 3: NORMAL
BH CV STRESS DURATION MIN STAGE 1: 3
BH CV STRESS DURATION MIN STAGE 2: 3
BH CV STRESS DURATION MIN STAGE 3: 3
BH CV STRESS DURATION SEC STAGE 1: 0
BH CV STRESS DURATION SEC STAGE 2: 0
BH CV STRESS DURATION SEC STAGE 3: 0
BH CV STRESS GRADE STAGE 1: 10
BH CV STRESS GRADE STAGE 2: 12
BH CV STRESS GRADE STAGE 3: 14
BH CV STRESS HR STAGE 1: 102
BH CV STRESS HR STAGE 2: 128
BH CV STRESS HR STAGE 3: 154
BH CV STRESS METS STAGE 1: 5
BH CV STRESS METS STAGE 2: 7.5
BH CV STRESS METS STAGE 3: 10
BH CV STRESS PROTOCOL 1: NORMAL
BH CV STRESS RECOVERY BP: NORMAL MMHG
BH CV STRESS RECOVERY HR: 97 BPM
BH CV STRESS SPEED STAGE 1: 1.7
BH CV STRESS SPEED STAGE 2: 2.5
BH CV STRESS SPEED STAGE 3: 3.4
BH CV STRESS STAGE 1: 1
BH CV STRESS STAGE 2: 2
BH CV STRESS STAGE 3: 3
LV EF NUC BP: 53 %
MAXIMAL PREDICTED HEART RATE: 168 BPM
PERCENT MAX PREDICTED HR: 91.67 %
STRESS BASELINE BP: NORMAL MMHG
STRESS BASELINE HR: 70 BPM
STRESS PERCENT HR: 108 %
STRESS POST ESTIMATED WORKLOAD: 10 METS
STRESS POST EXERCISE DUR MIN: 9 MIN
STRESS POST EXERCISE DUR SEC: 0 SEC
STRESS POST PEAK BP: NORMAL MMHG
STRESS POST PEAK HR: 154 BPM
STRESS TARGET HR: 143 BPM

## 2020-03-12 PROCEDURE — 78452 HT MUSCLE IMAGE SPECT MULT: CPT | Performed by: INTERNAL MEDICINE

## 2020-03-12 PROCEDURE — A9502 TC99M TETROFOSMIN: HCPCS | Performed by: NURSE PRACTITIONER

## 2020-03-12 PROCEDURE — 0 TECHNETIUM TETROFOSMIN KIT: Performed by: NURSE PRACTITIONER

## 2020-03-12 PROCEDURE — 78452 HT MUSCLE IMAGE SPECT MULT: CPT

## 2020-03-12 PROCEDURE — 93016 CV STRESS TEST SUPVJ ONLY: CPT | Performed by: INTERNAL MEDICINE

## 2020-03-12 PROCEDURE — 93017 CV STRESS TEST TRACING ONLY: CPT

## 2020-03-12 PROCEDURE — 93018 CV STRESS TEST I&R ONLY: CPT | Performed by: INTERNAL MEDICINE

## 2020-03-12 RX ADMIN — TETROFOSMIN 1 DOSE: 1.38 INJECTION, POWDER, LYOPHILIZED, FOR SOLUTION INTRAVENOUS at 08:50

## 2020-03-12 RX ADMIN — TETROFOSMIN 1 DOSE: 1.38 INJECTION, POWDER, LYOPHILIZED, FOR SOLUTION INTRAVENOUS at 08:04

## 2020-03-12 NOTE — TELEPHONE ENCOUNTER
Called patient with his negative stress test results.  He has not picked up his lisinopril prescription that I gave him over a week ago.  I encouraged him to get this filled that I think his EKG changes were most likely related to his hypertension.

## 2020-08-17 ENCOUNTER — LAB REQUISITION (OUTPATIENT)
Dept: LAB | Facility: OTHER | Age: 55
End: 2020-08-17

## 2020-08-17 DIAGNOSIS — Z00.00 ANNUAL PHYSICAL EXAM: ICD-10-CM

## 2020-08-17 PROCEDURE — 86481 TB AG RESPONSE T-CELL SUSP: CPT | Performed by: PHYSICAL MEDICINE & REHABILITATION

## 2020-08-19 LAB
TSPOT INTERPRETATION: NEGATIVE
TSPOT NIL CONTROL INTERPRETATION: NORMAL
TSPOT PANEL A: 0
TSPOT PANEL B: 0
TSPOT POS CONTROL INTERPRETATION: NORMAL

## 2020-09-30 ENCOUNTER — OFFICE VISIT (OUTPATIENT)
Dept: CARDIOLOGY | Facility: CLINIC | Age: 55
End: 2020-09-30

## 2020-09-30 VITALS
OXYGEN SATURATION: 98 % | SYSTOLIC BLOOD PRESSURE: 128 MMHG | HEIGHT: 74 IN | BODY MASS INDEX: 28.62 KG/M2 | DIASTOLIC BLOOD PRESSURE: 84 MMHG | WEIGHT: 223 LBS | HEART RATE: 73 BPM

## 2020-09-30 DIAGNOSIS — R42 VERTIGO: ICD-10-CM

## 2020-09-30 DIAGNOSIS — I10 ESSENTIAL HYPERTENSION: Primary | ICD-10-CM

## 2020-09-30 DIAGNOSIS — I49.9 IRREGULAR HEARTBEAT: ICD-10-CM

## 2020-09-30 DIAGNOSIS — E78.1 HIGH BLOOD TRIGLYCERIDES: ICD-10-CM

## 2020-09-30 DIAGNOSIS — I49.3 FREQUENT PVCS: ICD-10-CM

## 2020-09-30 PROCEDURE — 93000 ELECTROCARDIOGRAM COMPLETE: CPT | Performed by: INTERNAL MEDICINE

## 2020-09-30 PROCEDURE — 99214 OFFICE O/P EST MOD 30 MIN: CPT | Performed by: INTERNAL MEDICINE

## 2020-09-30 PROCEDURE — 99406 BEHAV CHNG SMOKING 3-10 MIN: CPT | Performed by: INTERNAL MEDICINE

## 2020-09-30 NOTE — PROGRESS NOTES
PATIENTINFORMATION    Date of Office Visit: 20  Encounter Provider: Ester Arrieta MD  Place of Service: [facility] CARDIOLOGY  Patient Name: Luis Fernando Ramirez Jr.  : 1965    Subjective:         Patient ID: Luis Fernando Ramirez Jr. is a 55 y.o. male.      History of Present Illness    This is a gentleman who previously saw Dr. Vaughan in 2016 with complaints of palpitations.  At that time, he had a Holter monitor, which was unremarkable.  He had an echocardiogram, which showed normal left ventricular function with an ejection fraction of 60% and no valvular heart disease.  He had a nuclear stress test, which showed no ischemia or infarction.      I saw him in 2018 and at that time he was having worsening palpitations.  He had a normal stress test with ejection fraction of 55% and no ischemia.  Holter monitor showed palpitations corresponded to atrial bigeminy or occasional PVCs.    He saw Jolie Ch nurse practitioner in 2020 and was complaining of some chest discomfort.  He had a nuclear stress test in 2020 which showed normal ejection fraction of 53% and no evidence of ischemia.    He comes in today for follow-up.  He has been feeling well from a cardiac standpoint.  He denies any chest pain.  He is no longer having palpitations.  He is not having shortness of breath.  Unfortunately he is not working out on a regular basis.  He does not have exertional symptoms though.  Unfortunately he was not successful in quitting smoking and is still smoking about a pack of cigarettes a day.  For the last 2 weeks he has been having vertigo symptoms.  When he changes position or even rolls over in bed he has a room spinning type sensation around him.  This is also worse if he is laying down and sits up and occurred while he was in the office today getting his EKG.    Review of Systems   Constitution: Negative for fever, malaise/fatigue, weight gain and weight  "loss.   HENT: Negative for ear pain, hearing loss, nosebleeds and sore throat.    Eyes: Negative for double vision, pain, vision loss in left eye and vision loss in right eye.   Cardiovascular:        See history of present illness.   Respiratory: Negative for cough, shortness of breath, sleep disturbances due to breathing, snoring and wheezing.    Endocrine: Negative for cold intolerance, heat intolerance and polyuria.   Skin: Negative for itching, poor wound healing and rash.   Musculoskeletal: Negative for joint pain, joint swelling and myalgias.   Gastrointestinal: Negative for abdominal pain, diarrhea, hematochezia, nausea and vomiting.   Genitourinary: Negative for hematuria and hesitancy.   Neurological: Positive for vertigo. Negative for numbness, paresthesias and seizures.   Psychiatric/Behavioral: Negative for depression. The patient is not nervous/anxious.            ECG 12 Lead    Date/Time: 9/30/2020 11:15 AM  Performed by: Ester Arrieta MD  Authorized by: Ester Arrieta MD   Comparison: compared with previous ECG from 3/3/2020  Comparison to previous ECG: There is less T wave inversion  Rhythm: sinus rhythm  BPM: 73  Conduction: conduction normal  T inversion: V6    Clinical impression: abnormal EKG               Objective:     /84 (BP Location: Left arm)   Pulse 73   Ht 188 cm (74\")   Wt 101 kg (223 lb)   SpO2 98%   BMI 28.63 kg/m²  Body mass index is 28.63 kg/m².     Vitals signs reviewed.   Constitutional:       Appearance: Normal appearance. Well-developed.   Eyes:      General: Lids are normal. Lids are everted, no foreign bodies appreciated.      Conjunctiva/sclera: Conjunctivae normal.      Pupils: Pupils are equal, round, and reactive to light.   HENT:      Head: Normocephalic and atraumatic.   Neck:      Musculoskeletal: Normal range of motion.      Thyroid: No thyroid mass.      Vascular: No carotid bruit or JVD.      Trachea: No tracheal deviation.   Pulmonary:      Effort: " Pulmonary effort is normal.      Breath sounds: Normal breath sounds.   Cardiovascular:      Normal rate. Regular rhythm.   Pulses:     Dorsalis pedis: 2+ bilaterally.  Abdominal:      General: Bowel sounds are normal.   Musculoskeletal: Normal range of motion.   Skin:     General: Skin is warm and dry.   Neurological:      Mental Status: Alert.   Psychiatric:         Behavior: Behavior normal.         Lab Review: I reviewed his labs from February 2020.  Triglycerides were 404.  HDL low at 24.  Encouraged exercise.        Assessment/Plan:       1.  Vertigo.  Referral to ENT  2.  Palpitations.  Correlated previously with PVCs.  3.  Tobacco use  4.  Hypertension  5.  Hypertriglyceridemia    I will have him come back to see me as needed if he has any changes in his symptoms.  Follow-up with Dr. Wood and ENT    Luis Fernando Ramirez Jr.  reports that he has been smoking. He started smoking about 33 years ago. He uses smokeless tobacco.. I have educated him on the risk of diseases from using tobacco products such as cancer, COPD and heart diease.     I advised him to quit and he is willing to quit. We have discussed the following method/s for tobacco cessation:  Counseling.  Together we have set a quit date for tod be determined.  He will follow up with his PCP for further discussion. He is going to try to cut back in the mean time.     I spent 5 minutes counseling the patient.    Orders Placed This Encounter   Procedures   • Ambulatory Referral to ENT (Otolaryngology)     Referral Priority:   Routine     Referral Type:   Consultation     Referral Reason:   Specialty Services Required     Requested Specialty:   Otolaryngology     Number of Visits Requested:   1   • ECG 12 Lead     This order was created via procedure documentation        Discharge Medications          Accurate as of September 30, 2020 11:15 AM. If you have any questions, ask your nurse or doctor.            Stop These Medications    aspirin 81 MG  tablet  Stopped by: Ester Arrieta MD     lisinopril 10 MG tablet  Commonly known as: PRINIVIL,ZESTRIL  Stopped by: MD Ester Bolanos MD  09/30/20  11:15 EDT

## 2020-10-09 ENCOUNTER — PATIENT MESSAGE (OUTPATIENT)
Dept: CARDIOLOGY | Facility: CLINIC | Age: 55
End: 2020-10-09

## 2020-10-12 ENCOUNTER — TELEPHONE (OUTPATIENT)
Dept: CARDIOLOGY | Facility: CLINIC | Age: 55
End: 2020-10-12

## 2020-10-12 NOTE — TELEPHONE ENCOUNTER
"----- Message from Luis Fernando Ramirez Jr. sent at 10/9/2020  9:10 PM EDT -----  Regarding: Non-Urgent Medical Question  Contact: [phone]  I have a question about ECG 12-LEAD resulted on 9/30/20, 10:40 AM.    What does it mean by an \"abnormal ECG\" at the bottom.  I thought everything was Ok.  "

## 2021-03-26 ENCOUNTER — BULK ORDERING (OUTPATIENT)
Dept: CASE MANAGEMENT | Facility: OTHER | Age: 56
End: 2021-03-26

## 2021-03-26 DIAGNOSIS — Z23 IMMUNIZATION DUE: ICD-10-CM

## 2024-01-18 ENCOUNTER — OFFICE VISIT (OUTPATIENT)
Dept: FAMILY MEDICINE CLINIC | Facility: CLINIC | Age: 59
End: 2024-01-18
Payer: COMMERCIAL

## 2024-01-18 VITALS
SYSTOLIC BLOOD PRESSURE: 134 MMHG | DIASTOLIC BLOOD PRESSURE: 76 MMHG | RESPIRATION RATE: 16 BRPM | WEIGHT: 211.2 LBS | TEMPERATURE: 97.3 F | BODY MASS INDEX: 27.11 KG/M2 | OXYGEN SATURATION: 97 % | HEIGHT: 74 IN | HEART RATE: 80 BPM

## 2024-01-18 DIAGNOSIS — F17.200 SMOKER: ICD-10-CM

## 2024-01-18 DIAGNOSIS — Z86.79 H/O: HTN (HYPERTENSION): ICD-10-CM

## 2024-01-18 DIAGNOSIS — R00.2 PALPITATION: Primary | ICD-10-CM

## 2024-01-18 DIAGNOSIS — R68.89 COLD INTOLERANCE: ICD-10-CM

## 2024-01-18 DIAGNOSIS — E78.5 DYSLIPIDEMIA: ICD-10-CM

## 2024-01-18 DIAGNOSIS — F41.9 ANXIETY: ICD-10-CM

## 2024-01-18 PROCEDURE — 99204 OFFICE O/P NEW MOD 45 MIN: CPT | Performed by: FAMILY MEDICINE

## 2024-01-18 PROCEDURE — 93000 ELECTROCARDIOGRAM COMPLETE: CPT | Performed by: FAMILY MEDICINE

## 2024-01-18 RX ORDER — PROPRANOLOL HYDROCHLORIDE 20 MG/1
20 TABLET ORAL 2 TIMES DAILY PRN
Qty: 60 TABLET | Refills: 1 | Status: SHIPPED | OUTPATIENT
Start: 2024-01-18

## 2024-01-18 NOTE — PROGRESS NOTES
"Subjective     Luis Fernando Ramirez Jr. is a 58 y.o. male.     Chief Complaint   Patient presents with    Rapid Heart Rate     Hours after working out. Past 2 weeks 116-117, has done breathing exercises to bring it down. Even going to sleep heart rate is elevated. Does have a cardiologist, heart can flutter for half hour.     Establish Care    Anxiety     Started in November.        History of Present Illness     To establish care, discuss the followings ;    Do Martial arts , do training for that.     H/o HTN , Saw cardiology in 2020, started on Lisinopril 10 mg, stopped taking vit after 1 month for ? Reason.   He starts checking his Bp at home for last 2 weeks , ranges 130's-80\"s    C/o palpitation;   He felt his heart is racing while he was driving in 100-110\"s, raise even with no activity.   Tried breathing exercise with no better.   No h/o thyroid dis but he feels always cold.     Anxiety ; had bad anxiety when he was doing presentation infront of group of people     Smokes 1 PPD     Elevated TG; on HD    Lab Results   Component Value Date    CHLPL 193 02/07/2020    TRIG 404 (H) 02/07/2020    HDL 24 (L) 02/07/2020    LDL CANCELED 02/07/2020         Lab Results   Component Value Date    GLUCOSE 102 (H) 02/07/2020    BUN 14 07/25/2022    CREATININE 0.92 07/25/2022    BCR 15.2 07/25/2022    K 3.7 07/25/2022    CO2 25 07/25/2022    CALCIUM 9.2 07/25/2022    PROTENTOTREF 6.7 02/07/2020    ALBUMIN 4.1 07/25/2022    BILITOT 0.5 07/25/2022    AST 13 07/25/2022    ALT 20 07/25/2022             ECG 12 Lead    Date/Time: 1/18/2024 2:53 PM  Performed by: Palma Barker MD    Authorized by: Palma Barker MD  Comparison: compared with previous ECG from 9/30/2020  Rhythm: sinus rhythm  Rate: normal  ST Segments: ST segments normal  QRS axis: left  Other findings: T wave abnormality    Clinical impression: abnormal EKG  Comments: Discussed , referral to cardio            Labs and documentation from previous PCP / consulting " physician has been reviewed          The following portions of the patient's history were reviewed and updated as appropriate: allergies, current medications, past family history, past medical history, past social history, past surgical history, and problem list.        Review of Systems   Cardiovascular:  Positive for palpitations.   Endocrine: Positive for cold intolerance.       Vitals:    01/18/24 1424   BP: 134/76   Pulse: 80   Resp: 16   Temp: 97.3 °F (36.3 °C)   SpO2: 97%           01/18/24  1424   Weight: 95.8 kg (211 lb 3.2 oz)         Body mass index is 27.1 kg/m².      Current Outpatient Medications   Medication Sig Dispense Refill    propranolol (INDERAL) 20 MG tablet Take 1 tablet by mouth 2 (Two) Times a Day As Needed (palpitation, anxiety). 60 tablet 1     No current facility-administered medications for this visit.                Objective   Physical Exam  Vitals and nursing note reviewed.   Constitutional:       General: He is not in acute distress.     Appearance: He is not toxic-appearing.   Neck:      Comments: No enlarged thyroid      Cardiovascular:      Rate and Rhythm: Normal rate and regular rhythm.      Heart sounds: Normal heart sounds. No murmur heard.     No friction rub. No gallop.   Pulmonary:      Effort: Pulmonary effort is normal. No respiratory distress.      Breath sounds: Normal breath sounds. No stridor. No wheezing or rhonchi.   Musculoskeletal:      Cervical back: Neck supple.   Neurological:      Mental Status: He is alert and oriented to person, place, and time.   Psychiatric:         Mood and Affect: Mood normal.         Behavior: Behavior normal.         Thought Content: Thought content normal.           Assessment & Plan   Diagnoses and all orders for this visit:    1. Palpitation (Primary)  -     ECG 12 Lead  -     TSH Rfx On Abnormal To Free T4  -     Comprehensive Metabolic Panel  -     CBC (No Diff)  -     Ambulatory Referral to Cardiology  -     propranolol (INDERAL)  20 MG tablet; Take 1 tablet by mouth 2 (Two) Times a Day As Needed (palpitation, anxiety).  Dispense: 60 tablet; Refill: 1    2. Cold intolerance  -     TSH Rfx On Abnormal To Free T4    3. Dyslipidemia  -     Lipid Panel    4. Anxiety  -     propranolol (INDERAL) 20 MG tablet; Take 1 tablet by mouth 2 (Two) Times a Day As Needed (palpitation, anxiety).  Dispense: 60 tablet; Refill: 1    5. Smoker    6. H/O: HTN (hypertension)  Comments:  close BP monitoring      -              Tobacco abuse - The patient has significant smoking history.  I had a discussion with the patient about the importance of quitting smoking.   I specifically discussed strategies for quitting including, changing habits.      Patient was given instructions and counseling regarding her condition or for health maintenance advice.   Please see specific information pulled into the AVS if appropriate.       I have fully discussed the nature of the medical condition(s) risks, complications, management, safe and proper use of medications.   Pt stated no allergy to the above prescribed medication.  I have discussed the SIDE EFFECT OF MEDICATION and importance TO report any side effect , the patient expressed good understanding.  Encouraged medication compliance and the importance of keeping scheduled follow up appointments with me and any other providers.    Patient instructed to follow up with our office for results on any labs/imaging ordered during this visit.    Home care discussed  All questions answered  Patient verbalizes understanding and agrees to treatment plan.     Follow up: Return in about 6 weeks (around 2/29/2024) for physical, follow up on current illness.

## 2024-01-19 LAB
ALBUMIN SERPL-MCNC: 4.3 G/DL (ref 3.5–5.2)
ALBUMIN/GLOB SERPL: 2 G/DL
ALP SERPL-CCNC: 85 U/L (ref 39–117)
ALT SERPL-CCNC: 17 U/L (ref 1–41)
AST SERPL-CCNC: 14 U/L (ref 1–40)
BILIRUB SERPL-MCNC: 0.2 MG/DL (ref 0–1.2)
BUN SERPL-MCNC: 13 MG/DL (ref 6–20)
BUN/CREAT SERPL: 10.2 (ref 7–25)
CALCIUM SERPL-MCNC: 9.5 MG/DL (ref 8.6–10.5)
CHLORIDE SERPL-SCNC: 105 MMOL/L (ref 98–107)
CHOLEST SERPL-MCNC: 212 MG/DL (ref 0–200)
CO2 SERPL-SCNC: 29.9 MMOL/L (ref 22–29)
CREAT SERPL-MCNC: 1.27 MG/DL (ref 0.76–1.27)
EGFRCR SERPLBLD CKD-EPI 2021: 66.3 ML/MIN/1.73
ERYTHROCYTE [DISTWIDTH] IN BLOOD BY AUTOMATED COUNT: 12.5 % (ref 12.3–15.4)
GLOBULIN SER CALC-MCNC: 2.1 GM/DL
GLUCOSE SERPL-MCNC: 96 MG/DL (ref 65–99)
HCT VFR BLD AUTO: 44.9 % (ref 37.5–51)
HDLC SERPL-MCNC: 27 MG/DL (ref 40–60)
HGB BLD-MCNC: 15 G/DL (ref 13–17.7)
LDLC SERPL CALC-MCNC: 134 MG/DL (ref 0–100)
MCH RBC QN AUTO: 29.8 PG (ref 26.6–33)
MCHC RBC AUTO-ENTMCNC: 33.4 G/DL (ref 31.5–35.7)
MCV RBC AUTO: 89.1 FL (ref 79–97)
PLATELET # BLD AUTO: 253 10*3/MM3 (ref 140–450)
POTASSIUM SERPL-SCNC: 4.6 MMOL/L (ref 3.5–5.2)
PROT SERPL-MCNC: 6.4 G/DL (ref 6–8.5)
RBC # BLD AUTO: 5.04 10*6/MM3 (ref 4.14–5.8)
SODIUM SERPL-SCNC: 143 MMOL/L (ref 136–145)
TRIGL SERPL-MCNC: 281 MG/DL (ref 0–150)
TSH SERPL DL<=0.005 MIU/L-ACNC: 2.14 UIU/ML (ref 0.27–4.2)
VLDLC SERPL CALC-MCNC: 51 MG/DL (ref 5–40)
WBC # BLD AUTO: 10.31 10*3/MM3 (ref 3.4–10.8)

## 2024-01-25 ENCOUNTER — PATIENT ROUNDING (BHMG ONLY) (OUTPATIENT)
Dept: FAMILY MEDICINE CLINIC | Facility: CLINIC | Age: 59
End: 2024-01-25
Payer: COMMERCIAL

## 2024-01-25 NOTE — PROGRESS NOTES
January 25, 2024    Hello, may I speak with Luis Fernando Ramirez Jr.?    My name is Izabella Hernandez      [facility] am  [facility]  [address] [place] [id number] [phone].    Before we get started may I verify your date of birth? 1965    I am calling to officially welcome you to our practice and ask about your recent visit. Is this a good time to talk? No, my chart message sent

## 2024-02-29 ENCOUNTER — OFFICE VISIT (OUTPATIENT)
Dept: FAMILY MEDICINE CLINIC | Facility: CLINIC | Age: 59
End: 2024-02-29
Payer: COMMERCIAL

## 2024-02-29 VITALS
HEART RATE: 62 BPM | WEIGHT: 212.2 LBS | TEMPERATURE: 96.9 F | DIASTOLIC BLOOD PRESSURE: 82 MMHG | OXYGEN SATURATION: 98 % | SYSTOLIC BLOOD PRESSURE: 118 MMHG | HEIGHT: 74 IN | RESPIRATION RATE: 16 BRPM | BODY MASS INDEX: 27.23 KG/M2

## 2024-02-29 DIAGNOSIS — Z12.11 SCREENING FOR COLON CANCER: ICD-10-CM

## 2024-02-29 DIAGNOSIS — F17.200 SMOKER: ICD-10-CM

## 2024-02-29 DIAGNOSIS — Z00.00 ENCOUNTER FOR ANNUAL PHYSICAL EXAM: Primary | ICD-10-CM

## 2024-02-29 DIAGNOSIS — R00.2 PALPITATION: ICD-10-CM

## 2024-02-29 DIAGNOSIS — E86.0 MILD DEHYDRATION: ICD-10-CM

## 2024-02-29 NOTE — PROGRESS NOTES
Patient Care Team:  Palma Barker MD as PCP - General (Urgent Care)     Chief complaint: Patient is in today for a physical          Patient is a 58 y.o. male who presents for his yearly physical exam.     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 He is monitoring his BP but not the HR.  His BP wnl. He dose not drinks enough water . He is taking Inderal PRN WITH HELP. HE MISSED HIS APPOIN WITH CARDIO TODAY.   Smokes  1 PPD, not ready to quit.   Eating RD .   Immunizations: reviewed and discussed.       Health maintenance/lifestyle:  Immunization History   Administered Date(s) Administered    COVID-19 (MODERNA) 1st,2nd,3rd Dose Monovalent 01/17/2021, 02/14/2021    COVID-19 (MODERNA) BIVALENT 12+YRS 09/14/2022    COVID-19 (MODERNA) Monovalent Original Booster 10/22/2021, 04/18/2022    COVID-19 F23 (MODERNA) 12YRS+ (SPIKEVAX) 09/17/2023    Fluzone (or Fluarix & Flulaval for VFC) >6mos 09/25/2019, 09/14/2022    Influenza Injectable Mdck Pf Quad 09/16/2023    Influenza, Unspecified 10/04/2020    Pneumococcal Conjugate 13-Valent (PCV13) 10/23/2017    flucelvax quad pfs =>4 YRS 10/04/2020         HM;  Colorectal Screening: never had one       Social History     Tobacco Use   Smoking Status Every Day    Packs/day: 1.00    Years: 15.00    Additional pack years: 0.00    Total pack years: 15.00    Types: Cigarettes    Start date: 1/1/1987   Smokeless Tobacco Current   Tobacco Comments    20+     Social History     Substance and Sexual Activity   Alcohol Use Yes    Alcohol/week: 1.0 standard drink of alcohol    Types: 1 Cans of beer per week    Comment: daily caffiene         Review of Systems   Cardiovascular:  Positive for palpitations.         History  Past Medical History:   Diagnosis Date    Asthma     Chest pain     Familial hypercholesterolemia     Heart murmur     Hypertension     Irregular heart beats     PVC (premature ventricular contraction)       History reviewed. No pertinent  "surgical history.   No Known Allergies   Family History   Problem Relation Age of Onset    Heart attack Maternal Grandfather     Heart disease Father      Social History     Socioeconomic History    Marital status:    Tobacco Use    Smoking status: Every Day     Packs/day: 1.00     Years: 15.00     Additional pack years: 0.00     Total pack years: 15.00     Types: Cigarettes     Start date: 1/1/1987    Smokeless tobacco: Current    Tobacco comments:     20+   Substance and Sexual Activity    Alcohol use: Yes     Alcohol/week: 1.0 standard drink of alcohol     Types: 1 Cans of beer per week     Comment: daily caffiene    Drug use: Yes     Types: Marijuana     Comment: Once monthly    Sexual activity: Yes     Partners: Female     Birth control/protection: None, Same-sex partner        Current Outpatient Medications:     propranolol (INDERAL) 20 MG tablet, Take 1 tablet by mouth 2 (Two) Times a Day As Needed (palpitation, anxiety)., Disp: 60 tablet, Rfl: 1                  /82   Pulse 62   Temp 96.9 °F (36.1 °C)   Resp 16   Ht 188 cm (74.02\")   Wt 96.3 kg (212 lb 3.2 oz)   SpO2 98%   BMI 27.23 kg/m²       Physical Exam  Vitals and nursing note reviewed.   Constitutional:       General: He is not in acute distress.     Appearance: He is not toxic-appearing.   HENT:      Nose: Nose normal.      Mouth/Throat:      Mouth: Mucous membranes are dry.   Eyes:      Conjunctiva/sclera: Conjunctivae normal.   Cardiovascular:      Rate and Rhythm: Normal rate and regular rhythm.      Heart sounds: Normal heart sounds. No murmur heard.  Pulmonary:      Effort: Pulmonary effort is normal. No respiratory distress.      Breath sounds: Normal breath sounds. No stridor. No wheezing, rhonchi or rales.   Abdominal:      General: Bowel sounds are normal. There is no distension.      Palpations: Abdomen is soft. There is no mass.      Tenderness: There is no abdominal tenderness. There is no guarding or rebound.      " Hernia: No hernia is present.   Neurological:      Mental Status: He is alert and oriented to person, place, and time.   Psychiatric:         Mood and Affect: Mood normal.         Behavior: Behavior normal.         Thought Content: Thought content normal.                   Diagnoses and all orders for this visit:    1. Encounter for annual physical exam (Primary)    2. Screening for colon cancer  -     Cologuard - Stool, Per Rectum; Future    3. Palpitation  Comments:  multifact , TSH normal  advised to eat snacks , drinks enough water before class, monitor HR    4. Mild dehydration  Comments:  ADVISED TO SEE CARD    5. Smoker  Comments:  discussed cessaton , not ready yet      -Age and sex appropriate physical exam performed and documented.   -Updated past medical, family, social and surgical histories as well as allergies and care team list.   -Addressed care gaps listed in the medical record.  -advised to eat healthy diet, do daily exercise   - discussed and updates preventive screening measures         Follow up: Return in about 6 weeks (around 4/11/2024) for follow up on current illness.  Plan of care discussed with pt. They verbalized understanding and agreement.     Palma Barker MD   2/29/2024   15:13 EST

## 2024-03-07 ENCOUNTER — OFFICE VISIT (OUTPATIENT)
Dept: CARDIOLOGY | Facility: CLINIC | Age: 59
End: 2024-03-07
Payer: COMMERCIAL

## 2024-03-07 VITALS
SYSTOLIC BLOOD PRESSURE: 120 MMHG | HEART RATE: 69 BPM | WEIGHT: 218 LBS | DIASTOLIC BLOOD PRESSURE: 76 MMHG | OXYGEN SATURATION: 99 % | RESPIRATION RATE: 16 BRPM | BODY MASS INDEX: 27.98 KG/M2 | HEIGHT: 74 IN

## 2024-03-07 DIAGNOSIS — R00.2 PALPITATIONS: Primary | ICD-10-CM

## 2024-03-07 DIAGNOSIS — I49.3 FREQUENT PVCS: ICD-10-CM

## 2024-03-07 DIAGNOSIS — R94.31 ABNORMAL EKG: ICD-10-CM

## 2024-03-07 DIAGNOSIS — E78.01 FAMILIAL HYPERCHOLESTEROLEMIA: ICD-10-CM

## 2024-03-07 PROCEDURE — 99204 OFFICE O/P NEW MOD 45 MIN: CPT | Performed by: INTERNAL MEDICINE

## 2024-03-07 PROCEDURE — 93000 ELECTROCARDIOGRAM COMPLETE: CPT | Performed by: INTERNAL MEDICINE

## 2024-03-07 NOTE — PROGRESS NOTES
"      CARDIOLOGY    Ester Arrieta MD    ENCOUNTER DATE:  03/07/2024    Luis Fernando Ramirez Jr. / 58 y.o. / male        CHIEF COMPLAINT / REASON FOR OFFICE VISIT     Heart Problem (New Patient: wanting to establish care /)      HISTORY OF PRESENT ILLNESS       HPI    Luis Fernando Ramirez Jr. is a 58 y.o. male     This is a gentleman I have seen in the remote past. He had a stress echo in 2018 which showed normal LV function, no evidence of ischemia. Zio patch in 2018 was benign with palpitations corresponding to PVCs. Treadmill stress test in 2020 showed nonspecific changes but since it was not diagnostic he went on to have a nuclear stress test in March of 2020 which showed no evidence of ischemia.     He comes in to see me because he has been having some episodes where he feels like his heart is racing. This corresponds to an elevated heart rate on his watch. He has also noticed some symptoms with exertion. 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         REVIEW OF SYSTEMS     Review of Systems   Constitutional: Negative for chills, fever, weight gain and weight loss.   Cardiovascular:  Negative for leg swelling.   Respiratory:  Positive for snoring. Negative for cough and wheezing.    Hematologic/Lymphatic: Negative for bleeding problem. Does not bruise/bleed easily.   Skin:  Negative for color change.   Musculoskeletal:  Positive for joint pain. Negative for falls and myalgias.   Gastrointestinal:  Negative for melena.   Genitourinary:  Negative for hematuria.   Neurological:  Negative for excessive daytime sleepiness.   Psychiatric/Behavioral:  Positive for depression. The patient is not nervous/anxious.          VITAL SIGNS     Visit Vitals  /76 (BP Location: Right arm, Patient Position: Sitting, Cuff Size: Adult)   Pulse 69   Resp 16   Ht 188 cm (74\")   Wt 98.9 kg (218 lb)   SpO2 99%   BMI 27.99 kg/m²         Wt Readings from Last 3 " Encounters:   03/07/24 98.9 kg (218 lb)   02/29/24 96.3 kg (212 lb 3.2 oz)   01/18/24 95.8 kg (211 lb 3.2 oz)     Body mass index is 27.99 kg/m².      PHYSICAL EXAMINATION     Constitutional:       General: Not in acute distress.  Neck:      Vascular: No carotid bruit or JVD.   Pulmonary:      Effort: Pulmonary effort is normal.      Breath sounds: Normal breath sounds.   Cardiovascular:      Normal rate. Regular rhythm.      Murmurs: There is no murmur.   Psychiatric:         Mood and Affect: Mood and affect normal.           REVIEWED DATA       ECG 12 Lead    Date/Time: 3/7/2024 9:26 AM  Performed by: Ester Arrieta MD    Authorized by: Ester Arrieta MD  Comparison: compared with previous ECG from 1/18/2024  Similar to previous ECG  Rhythm: sinus rhythm  BPM: 69  Conduction: conduction normal  T inversion: V3, V4, V5 and V6    Clinical impression: abnormal EKG            Lipid Panel          1/18/2024    15:44   Lipid Panel   Total Cholesterol 212    Triglycerides 281    HDL Cholesterol 27    VLDL Cholesterol 51    LDL Cholesterol  134        Lab Results   Component Value Date    GLUCOSE 96 01/18/2024    BUN 13 01/18/2024    CREATININE 1.27 01/18/2024    EGFRRESULT 66.3 01/18/2024    BCR 10.2 01/18/2024    K 4.6 01/18/2024    CO2 29.9 (H) 01/18/2024    CALCIUM 9.5 01/18/2024    PROTENTOTREF 6.4 01/18/2024    ALBUMIN 4.3 01/18/2024    BILITOT 0.2 01/18/2024    AST 14 01/18/2024    ALT 17 01/18/2024       ASSESSMENT & PLAN      Diagnosis Plan   1. Palpitations  Adult Stress Echo W/ Cont or Stress Agent if Necessary Per Protocol    Holter Monitor - 72 Hour Up To 15 Days      2. Abnormal EKG  Adult Stress Echo W/ Cont or Stress Agent if Necessary Per Protocol    Holter Monitor - 72 Hour Up To 15 Days      3. Familial hypercholesterolemia  Adult Stress Echo W/ Cont or Stress Agent if Necessary Per Protocol    Holter Monitor - 72 Hour Up To 15 Days      4. Frequent PVCs  Adult Stress Echo W/ Cont or Stress Agent  if Necessary Per Protocol    Holter Monitor - 72 Hour Up To 15 Days          Racing heart and palpitations. I recommend a Zio patch to see if these correlates to any arrhythmia.   Abnormal EKG.   Dyspnea on exertion. Check a stress echocardiogram. We cannot do a straight treadmill because he has an abnormal EKG at baseline which is different compared to 2020 but not different compared to earlier this year.   Hypertension. Blood pressure controlled.   Hyperlipidemia.   Symptomatic PVCs    One of my nurses or I will go over the results of his stress test and Zio patch when those results become available and determine followup from there.         Orders Placed This Encounter   Procedures    Holter Monitor - 72 Hour Up To 15 Days     Standing Status:   Future     Standing Expiration Date:   3/7/2025     Order Specific Question:   Reason for exam?     Answer:   Palpitations     Order Specific Question:   How many days is the patient to wear the monitor?     Answer:   14     Order Specific Question:   Release to patient     Answer:   Routine Release [[id number]]    Adult Stress Echo W/ Cont or Stress Agent if Necessary Per Protocol     Standing Status:   Future     Standing Expiration Date:   3/7/2025     Order Specific Question:   What stress agent will be used?     Answer:   Exercise with [employer]     Order Specific Question:   Reason for exam?     Answer:   Abnormal EKG     Order Specific Question:   Release to patient     Answer:   Routine Release [[id number]]           MEDICATIONS         Discharge Medications            Accurate as of March 7, 2024  9:23 AM. If you have any questions, ask your nurse or doctor.                Continue These Medications        Instructions Start Date   propranolol 20 MG tablet  Commonly known as: INDERAL   20 mg, Oral, 2 Times Daily PRN                 Ester Arrieta MD  03/07/24  09:23 EST    Part of this note may be an electronic transcription/translation of spoken  language to printed text using the Dragon dictation system.

## 2024-03-13 ENCOUNTER — TELEPHONE (OUTPATIENT)
Dept: CARDIOLOGY | Facility: CLINIC | Age: 59
End: 2024-03-13
Payer: COMMERCIAL

## 2024-03-18 ENCOUNTER — HOSPITAL ENCOUNTER (OUTPATIENT)
Dept: CARDIOLOGY | Facility: HOSPITAL | Age: 59
Discharge: HOME | End: 2024-03-18
Admitting: INTERNAL MEDICINE
Payer: COMMERCIAL

## 2024-03-18 ENCOUNTER — TELEPHONE (OUTPATIENT)
Dept: CARDIOLOGY | Facility: CLINIC | Age: 59
End: 2024-03-18

## 2024-03-18 VITALS
SYSTOLIC BLOOD PRESSURE: 138 MMHG | HEIGHT: 74 IN | WEIGHT: 218 LBS | DIASTOLIC BLOOD PRESSURE: 70 MMHG | HEART RATE: 70 BPM | BODY MASS INDEX: 27.98 KG/M2

## 2024-03-18 DIAGNOSIS — R00.2 PALPITATIONS: ICD-10-CM

## 2024-03-18 DIAGNOSIS — I49.3 FREQUENT PVCS: ICD-10-CM

## 2024-03-18 DIAGNOSIS — E78.01 FAMILIAL HYPERCHOLESTEROLEMIA: ICD-10-CM

## 2024-03-18 DIAGNOSIS — R94.31 ABNORMAL EKG: ICD-10-CM

## 2024-03-18 LAB
AORTIC ARCH: 2.7 CM
AORTIC DIMENSIONLESS INDEX: 0.7 (DI)
ASCENDING AORTA: 2.7 CM
BH CV ECHO MEAS - ACS: 1.91 CM
BH CV ECHO MEAS - AO MAX PG: 7.1 MMHG
BH CV ECHO MEAS - AO MEAN PG: 4 MMHG
BH CV ECHO MEAS - AO ROOT DIAM: 3.6 CM
BH CV ECHO MEAS - AO V2 MAX: 133 CM/SEC
BH CV ECHO MEAS - AO V2 VTI: 28.5 CM
BH CV ECHO MEAS - AVA(I,D): 2.35 CM2
BH CV ECHO MEAS - EDV(CUBED): 127.1 ML
BH CV ECHO MEAS - EDV(MOD-SP2): 104 ML
BH CV ECHO MEAS - EDV(MOD-SP4): 135 ML
BH CV ECHO MEAS - EF(MOD-BP): 59.7 %
BH CV ECHO MEAS - EF(MOD-SP2): 57.7 %
BH CV ECHO MEAS - EF(MOD-SP4): 59.3 %
BH CV ECHO MEAS - ESV(CUBED): 42.7 ML
BH CV ECHO MEAS - ESV(MOD-SP2): 44 ML
BH CV ECHO MEAS - ESV(MOD-SP4): 55 ML
BH CV ECHO MEAS - FS: 30.5 %
BH CV ECHO MEAS - IVS/LVPW: 1.01 CM
BH CV ECHO MEAS - IVSD: 1 CM
BH CV ECHO MEAS - LAT PEAK E' VEL: 12.3 CM/SEC
BH CV ECHO MEAS - LV DIASTOLIC VOL/BSA (35-75): 59.9 CM2
BH CV ECHO MEAS - LV MASS(C)D: 182 GRAMS
BH CV ECHO MEAS - LV MAX PG: 4.3 MMHG
BH CV ECHO MEAS - LV MEAN PG: 2 MMHG
BH CV ECHO MEAS - LV SYSTOLIC VOL/BSA (12-30): 24.4 CM2
BH CV ECHO MEAS - LV V1 MAX: 104 CM/SEC
BH CV ECHO MEAS - LV V1 VTI: 21.2 CM
BH CV ECHO MEAS - LVIDD: 5 CM
BH CV ECHO MEAS - LVIDS: 3.5 CM
BH CV ECHO MEAS - LVOT AREA: 3.2 CM2
BH CV ECHO MEAS - LVOT DIAM: 2 CM
BH CV ECHO MEAS - LVPWD: 0.99 CM
BH CV ECHO MEAS - MED PEAK E' VEL: 9.9 CM/SEC
BH CV ECHO MEAS - MV A DUR: 0.17 SEC
BH CV ECHO MEAS - MV A MAX VEL: 68.1 CM/SEC
BH CV ECHO MEAS - MV DEC SLOPE: 429.9 CM/SEC2
BH CV ECHO MEAS - MV DEC TIME: 0.22 SEC
BH CV ECHO MEAS - MV E MAX VEL: 60 CM/SEC
BH CV ECHO MEAS - MV E/A: 0.88
BH CV ECHO MEAS - MV MAX PG: 2.9 MMHG
BH CV ECHO MEAS - MV MEAN PG: 1.13 MMHG
BH CV ECHO MEAS - MV P1/2T: 53.6 MSEC
BH CV ECHO MEAS - MV V2 VTI: 24.7 CM
BH CV ECHO MEAS - MVA(P1/2T): 4.1 CM2
BH CV ECHO MEAS - MVA(VTI): 2.7 CM2
BH CV ECHO MEAS - PA ACC TIME: 0.12 SEC
BH CV ECHO MEAS - PA V2 MAX: 86.9 CM/SEC
BH CV ECHO MEAS - PULM A REVS DUR: 0.15 SEC
BH CV ECHO MEAS - PULM A REVS VEL: 30.9 CM/SEC
BH CV ECHO MEAS - PULM DIAS VEL: 43.3 CM/SEC
BH CV ECHO MEAS - PULM S/D: 1.49
BH CV ECHO MEAS - PULM SYS VEL: 64.7 CM/SEC
BH CV ECHO MEAS - QP/QS: 1
BH CV ECHO MEAS - RAP SYSTOLE: 3 MMHG
BH CV ECHO MEAS - RV MAX PG: 1.83 MMHG
BH CV ECHO MEAS - RV V1 MAX: 67.7 CM/SEC
BH CV ECHO MEAS - RV V1 VTI: 14.2 CM
BH CV ECHO MEAS - RVOT DIAM: 2.45 CM
BH CV ECHO MEAS - SI(MOD-SP2): 26.6 ML/M2
BH CV ECHO MEAS - SI(MOD-SP4): 35.5 ML/M2
BH CV ECHO MEAS - SUP REN AO DIAM: 2.1 CM
BH CV ECHO MEAS - SV(LVOT): 66.9 ML
BH CV ECHO MEAS - SV(MOD-SP2): 60 ML
BH CV ECHO MEAS - SV(MOD-SP4): 80 ML
BH CV ECHO MEAS - SV(RVOT): 66.9 ML
BH CV ECHO MEAS - TAPSE (>1.6): 2.39 CM
BH CV ECHO MEASUREMENTS AVERAGE E/E' RATIO: 5.41
BH CV STRESS BP STAGE 1: NORMAL
BH CV STRESS BP STAGE 2: NORMAL
BH CV STRESS BP STAGE 3: NORMAL
BH CV STRESS DURATION MIN STAGE 1: 3
BH CV STRESS DURATION MIN STAGE 2: 3
BH CV STRESS DURATION MIN STAGE 3: 3
BH CV STRESS DURATION SEC STAGE 1: 0
BH CV STRESS DURATION SEC STAGE 2: 0
BH CV STRESS DURATION SEC STAGE 3: 0
BH CV STRESS ECHO POST STRESS EJECTION FRACTION EF: 73 %
BH CV STRESS GRADE STAGE 1: 10
BH CV STRESS GRADE STAGE 2: 12
BH CV STRESS GRADE STAGE 3: 14
BH CV STRESS HR STAGE 1: 101
BH CV STRESS HR STAGE 2: 128
BH CV STRESS HR STAGE 3: 154
BH CV STRESS METS STAGE 1: 5
BH CV STRESS METS STAGE 2: 7.5
BH CV STRESS METS STAGE 3: 10
BH CV STRESS PROTOCOL 1: NORMAL
BH CV STRESS SPEED STAGE 1: 1.7
BH CV STRESS SPEED STAGE 2: 2.5
BH CV STRESS SPEED STAGE 3: 3.4
BH CV STRESS STAGE 1: 1
BH CV STRESS STAGE 2: 2
BH CV STRESS STAGE 3: 3
BH CV XLRA - RV BASE: 3.6 CM
BH CV XLRA - RV LENGTH: 7.7 CM
BH CV XLRA - RV MID: 2.6 CM
BH CV XLRA - TDI S': 15.3 CM/SEC
LEFT ATRIUM VOLUME INDEX: 25.5 ML/M2
MAXIMAL PREDICTED HEART RATE: 164 BPM
PERCENT MAX PREDICTED HR: 93.9 %
SINUS: 3.1 CM
STJ: 2.7 CM
STRESS BASELINE BP: NORMAL MMHG
STRESS BASELINE HR: 64 BPM
STRESS PERCENT HR: 110 %
STRESS POST ESTIMATED WORKLOAD: 10 METS
STRESS POST EXERCISE DUR MIN: 9 MIN
STRESS POST EXERCISE DUR SEC: 0 SEC
STRESS POST PEAK BP: NORMAL MMHG
STRESS POST PEAK HR: 154 BPM
STRESS TARGET HR: 139 BPM

## 2024-03-18 PROCEDURE — 93016 CV STRESS TEST SUPVJ ONLY: CPT | Performed by: INTERNAL MEDICINE

## 2024-03-18 PROCEDURE — 93320 DOPPLER ECHO COMPLETE: CPT | Performed by: INTERNAL MEDICINE

## 2024-03-18 PROCEDURE — 25510000001 PERFLUTREN (DEFINITY) 8.476 MG IN SODIUM CHLORIDE (PF) 0.9 % 10 ML INJECTION: Performed by: INTERNAL MEDICINE

## 2024-03-18 PROCEDURE — 93350 STRESS TTE ONLY: CPT

## 2024-03-18 PROCEDURE — 93018 CV STRESS TEST I&R ONLY: CPT | Performed by: INTERNAL MEDICINE

## 2024-03-18 PROCEDURE — 93325 DOPPLER ECHO COLOR FLOW MAPG: CPT

## 2024-03-18 PROCEDURE — 93320 DOPPLER ECHO COMPLETE: CPT

## 2024-03-18 PROCEDURE — 93017 CV STRESS TEST TRACING ONLY: CPT

## 2024-03-18 PROCEDURE — 93352 ADMIN ECG CONTRAST AGENT: CPT | Performed by: INTERNAL MEDICINE

## 2024-03-18 PROCEDURE — 93350 STRESS TTE ONLY: CPT | Performed by: INTERNAL MEDICINE

## 2024-03-18 PROCEDURE — 93325 DOPPLER ECHO COLOR FLOW MAPG: CPT | Performed by: INTERNAL MEDICINE

## 2024-03-18 RX ADMIN — PERFLUTREN 4 ML: 6.52 INJECTION, SUSPENSION INTRAVENOUS at 09:43

## 2024-03-18 NOTE — TELEPHONE ENCOUNTER
Notified patient of results/recommendations. Patient verbalized understanding.    Monet Mireles RN  Triage [facility] [place]

## 2024-03-18 NOTE — TELEPHONE ENCOUNTER
Please let him know that his stress echo was normal.  He did very well on it.  We will be in touch with the results of the Zio patch when it comes back.

## 2024-03-26 ENCOUNTER — TELEPHONE (OUTPATIENT)
Age: 59
End: 2024-03-26

## 2024-03-26 NOTE — TELEPHONE ENCOUNTER
Called and spoke with patient. Went over results. He verbalized understanding.    Thank you,    Rosalba Collins, RN  Triage [facility] [place]  03/26/24 14:36 EDT

## 2024-04-16 ENCOUNTER — OFFICE VISIT (OUTPATIENT)
Dept: FAMILY MEDICINE CLINIC | Facility: CLINIC | Age: 59
End: 2024-04-16
Payer: COMMERCIAL

## 2024-04-16 VITALS
BODY MASS INDEX: 27.21 KG/M2 | WEIGHT: 212 LBS | DIASTOLIC BLOOD PRESSURE: 80 MMHG | HEIGHT: 74 IN | TEMPERATURE: 97.7 F | HEART RATE: 65 BPM | OXYGEN SATURATION: 98 % | SYSTOLIC BLOOD PRESSURE: 122 MMHG | RESPIRATION RATE: 16 BRPM

## 2024-04-16 DIAGNOSIS — R00.2 PALPITATION: ICD-10-CM

## 2024-04-16 DIAGNOSIS — F41.0 PANIC ATTACKS: ICD-10-CM

## 2024-04-16 DIAGNOSIS — E78.5 DYSLIPIDEMIA: ICD-10-CM

## 2024-04-16 DIAGNOSIS — F41.9 ANXIETY: Primary | ICD-10-CM

## 2024-04-16 PROCEDURE — 99214 OFFICE O/P EST MOD 30 MIN: CPT | Performed by: FAMILY MEDICINE

## 2024-04-16 RX ORDER — BUSPIRONE HYDROCHLORIDE 5 MG/1
5-10 TABLET ORAL 3 TIMES DAILY
Qty: 90 TABLET | Refills: 1 | Status: SHIPPED | OUTPATIENT
Start: 2024-04-16

## 2024-04-16 NOTE — PROGRESS NOTES
Subjective     Luis Fernando Ramirez Jr. is a 59 y.o. male.     Chief Complaint   Patient presents with    Palpitations     6 week f/u    Anxiety    Hyperlipidemia       History of Present Illness     F/u on Palpitation ; has it on/off , mainly when he is stressed.  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    Saw cardiol, w/u done , all unremarkable.   Started on inderal PRN, it helps.    Anxiety; he is very anxious has low threshold to stress. Has frequent panic attacks.  Declined daily Rx.     HLD; eats RD, eats fast food. doing exercise , do judo   Labs showed low HDL, elevated TG and LDL    Lab Results   Component Value Date    CHLPL 212 (H) 01/18/2024    TRIG 281 (H) 01/18/2024    HDL 27 (L) 01/18/2024     (H) 01/18/2024         Labs has been ordered and personally/independently interpreted , discussed with pt        The following portions of the patient's history were reviewed and updated as appropriate: allergies, current medications, past family history, past medical history, past social history, past surgical history, and problem list.        Review of Systems   Cardiovascular:  Positive for palpitations.       Vitals:    04/16/24 1531   BP: 122/80   Pulse: 65   Resp: 16   Temp: 97.7 °F (36.5 °C)   SpO2: 98%           04/16/24  1531   Weight: 96.2 kg (212 lb)         Body mass index is 27.21 kg/m².      Current Outpatient Medications   Medication Sig Dispense Refill    propranolol (INDERAL) 20 MG tablet Take 1 tablet by mouth 2 (Two) Times a Day As Needed (palpitation, anxiety). 60 tablet 1    busPIRone (BUSPAR) 5 MG tablet Take 1-2 tablets by mouth 3 (Three) Times a Day. 90 tablet 1     No current facility-administered medications for this visit.                Objective   Physical Exam  Vitals and nursing note reviewed.   Constitutional:       General: He is not in acute distress.     Appearance: He is not toxic-appearing.   Cardiovascular:       Rate and Rhythm: Normal rate and regular rhythm.      Heart sounds: Normal heart sounds. No murmur heard.  Pulmonary:      Effort: Pulmonary effort is normal. No respiratory distress.      Breath sounds: Normal breath sounds. No stridor. No wheezing or rhonchi.   Neurological:      Mental Status: He is alert and oriented to person, place, and time.   Psychiatric:         Mood and Affect: Mood normal.         Behavior: Behavior normal.         Thought Content: Thought content normal.           Assessment & Plan   Diagnoses and all orders for this visit:    1. Anxiety (Primary)  Comments:  will start on Buspar  close monitoring  Orders:  -     busPIRone (BUSPAR) 5 MG tablet; Take 1-2 tablets by mouth 3 (Three) Times a Day.  Dispense: 90 tablet; Refill: 1    2. Panic attacks  Comments:  as above  Orders:  -     busPIRone (BUSPAR) 5 MG tablet; Take 1-2 tablets by mouth 3 (Three) Times a Day.  Dispense: 90 tablet; Refill: 1    3. Dyslipidemia  Comments:  discussed diet  recheck labs in 6 months  Orders:  -     Lipid panel; Future    4. Palpitation  Comments:  likely due to anxiety   stable with inderal PRN          Patient was given instructions and counseling regarding her condition or for health maintenance advice.   Please see specific information pulled into the AVS if appropriate.       I have fully discussed the nature of the medical condition(s) risks, complications, management, safe and proper use of medications.   Pt stated no allergy to the above prescribed medication.  I have discussed the SIDE EFFECT OF MEDICATION and importance TO report any side effect , the patient expressed good understanding.  Encouraged medication compliance and the importance of keeping scheduled follow up appointments with me and any other providers.    Patient instructed to follow up with our office for results on any labs/imaging ordered during this visit.    Home care discussed  All questions answered  Patient verbalizes  understanding and agrees to treatment plan.     Follow up: Return in about 3 months (around 7/16/2024) for follow up on current illness, labs before apointment g.

## 2024-08-28 ENCOUNTER — OFFICE VISIT (OUTPATIENT)
Dept: FAMILY MEDICINE CLINIC | Facility: CLINIC | Age: 59
End: 2024-08-28
Payer: COMMERCIAL

## 2024-08-28 VITALS
BODY MASS INDEX: 26.82 KG/M2 | SYSTOLIC BLOOD PRESSURE: 118 MMHG | HEIGHT: 74 IN | RESPIRATION RATE: 16 BRPM | WEIGHT: 209 LBS | DIASTOLIC BLOOD PRESSURE: 80 MMHG | TEMPERATURE: 97.3 F | OXYGEN SATURATION: 97 % | HEART RATE: 75 BPM

## 2024-08-28 DIAGNOSIS — E78.5 DYSLIPIDEMIA: Primary | ICD-10-CM

## 2024-08-28 DIAGNOSIS — Z12.5 SCREENING PSA (PROSTATE SPECIFIC ANTIGEN): ICD-10-CM

## 2024-08-28 DIAGNOSIS — R00.2 PALPITATION: ICD-10-CM

## 2024-08-28 DIAGNOSIS — Z12.2 SCREENING FOR LUNG CANCER: ICD-10-CM

## 2024-08-28 DIAGNOSIS — F41.0 PANIC ATTACKS: ICD-10-CM

## 2024-08-28 DIAGNOSIS — R03.0 ELEVATED BLOOD PRESSURE READING WITHOUT DIAGNOSIS OF HYPERTENSION: ICD-10-CM

## 2024-08-28 DIAGNOSIS — Z11.59 NEED FOR HEPATITIS C SCREENING TEST: ICD-10-CM

## 2024-08-28 DIAGNOSIS — F41.9 ANXIETY: ICD-10-CM

## 2024-08-28 PROCEDURE — 99214 OFFICE O/P EST MOD 30 MIN: CPT | Performed by: FAMILY MEDICINE

## 2024-08-28 NOTE — PROGRESS NOTES
Subjective     Luis Fernando Ramirez Jr. is a 59 y.o. male.     Chief Complaint   Patient presents with    Anxiety     Follow up    Panic Attack    Allergies    Mass     LUMP IN LIP THE SIZE OF A SMALL PEBBLE FEELS LIKE AN INFLAMED HAS GONE DOWN        History of Present Illness     He teaches a martial arts     He was c/o worsening allergy sx for last 2 weeks  Went to [facility] clinic  Started on zyrtec, flonase and sudafed, feels better.  During that time , noticed lump on his Lt side of the lip , size got down, dose not hurt.     Anxiety with panic attacks ; sx lot better , he took medication PRN. He has not taking these medication for a while       Palpitation ; on/off, feels flutter , last for minute , dose not take the inderal for a while.       HLD; eats RD, doing exercise   Labs showed low HDL, elevated TG and LDL    HTN;   Home /[address]  Eats RD  Smokes 1 ppd  Not drinks alcohol     Over wt; he lost few Ibs since last OV        Lab Results   Component Value Date    CHLPL 212 (H) 01/18/2024    TRIG 281 (H) 01/18/2024    HDL 27 (L) 01/18/2024     (H) 01/18/2024     Lab Results   Component Value Date    GLUCOSE 96 01/18/2024    BUN 13 01/18/2024    CREATININE 1.27 01/18/2024     01/18/2024    K 4.6 01/18/2024     01/18/2024    CALCIUM 9.5 01/18/2024    PROTEINTOT 6.9 07/25/2022    ALBUMIN 4.3 01/18/2024    ALT 17 01/18/2024    AST 14 01/18/2024    ALKPHOS 85 01/18/2024    BILITOT 0.2 01/18/2024    GLOB 2.8 07/25/2022    BCR 10.2 01/18/2024    ANIONGAP 9 07/25/2022            The following portions of the patient's history were reviewed and updated as appropriate: allergies, current medications, past family history, past medical history, past social history, past surgical history, and problem list.        Review of Systems   Cardiovascular:  Negative for chest pain.       Vitals:    08/28/24 1045   BP: 118/80   Pulse: 75   Resp: 16   Temp: 97.3 °F (36.3 °C)   SpO2: 97%           08/28/24  1049    Weight: 94.8 kg (209 lb)         Body mass index is 26.82 kg/m².      No current outpatient medications on file.     No current facility-administered medications for this visit.                Objective   Physical Exam  Vitals and nursing note reviewed.   Constitutional:       General: He is not in acute distress.     Appearance: He is not toxic-appearing.   HENT:      Nose: No congestion or rhinorrhea.   Cardiovascular:      Rate and Rhythm: Normal rate and regular rhythm.      Heart sounds: Normal heart sounds. No murmur heard.  Pulmonary:      Effort: Pulmonary effort is normal. No respiratory distress.      Breath sounds: Normal breath sounds. No stridor. No wheezing or rhonchi.   Neurological:      Mental Status: He is alert and oriented to person, place, and time.   Psychiatric:         Mood and Affect: Mood normal.         Behavior: Behavior normal.         Thought Content: Thought content normal.           Assessment & Plan   Diagnoses and all orders for this visit:    1. Dyslipidemia (Primary)  Comments:  discussed diet and daily Excer  Orders:  -     Lipid Panel    2. Anxiety  Comments:  sx much improved    3. Panic attacks  Comments:  sx much improved    4. Palpitation  Comments:  sx much improved    5. Elevated blood pressure reading without diagnosis of hypertension  Comments:  Close BP monitoring and f/u , BP login form provided ,discussed correct way to position self for BP accuracy    6. Screening for lung cancer  -     CT Chest Low Dose Wo; Future    7. Need for hepatitis C screening test  -     Hepatitis C Antibody    8. Screening PSA (prostate specific antigen)  -     PSA Screen        Labs has been ordered and personally/independently interpreted , discussed with pt     His lipid high  ASCVD risk is 18.3  I recommend statin.      Lab Results   Component Value Date    CHLPL 217 (H) 08/28/2024    TRIG 193 (H) 08/28/2024    HDL 26 (L) 08/28/2024     (H) 08/28/2024         The 10-year ASCVD  risk score (Angel DIAZ, et al., 2019) is: 18.3%    Values used to calculate the score:      Age: 59 years      Sex: Male      Is Non- : No      Diabetic: No      Tobacco smoker: Yes      Systolic Blood Pressure: 118 mmHg      Is BP treated: No      HDL Cholesterol: 26 mg/dL      Total Cholesterol: 217 mg/dL           I spent 35 minutes caring for this patient on this date of service. This time includes time spent by me in the following activities:preparing for the visit,reviewing previous medical records,  performing a medically appropriate examination and/or evaluation, counseling and educating the patient/family/caregiver, and documenting information in the medical record.       Patient was given instructions and counseling regarding her condition or for health maintenance advice. Please see specific information pulled into the AVS if appropriate.       I have fully discussed the nature of the medical condition(s) risks, complications, management, safe and proper use of medications.   Pt stated no allergy to the above prescribed medication.  I have discussed the SIDE EFFECT OF MEDICATION and importance TO report any side effect , the patient expressed good understanding.  Encouraged medication compliance and the importance of keeping scheduled follow up appointments with me and any other providers.    Patient instructed to follow up with our office for results on any labs/imaging ordered during this visit.    Home care discussed  All questions answered  Patient verbalizes understanding and agrees to treatment plan.     Follow up: Return in about 6 months (around 2/28/2025) for physical.

## 2024-08-29 LAB
CHOLEST SERPL-MCNC: 217 MG/DL (ref 0–200)
HCV IGG SERPL QL IA: NON REACTIVE
HDLC SERPL-MCNC: 26 MG/DL (ref 40–60)
LDLC SERPL CALC-MCNC: 155 MG/DL (ref 0–100)
PSA SERPL-MCNC: 1.75 NG/ML (ref 0–4)
TRIGL SERPL-MCNC: 193 MG/DL (ref 0–150)
VLDLC SERPL CALC-MCNC: 36 MG/DL (ref 5–40)

## 2024-09-20 ENCOUNTER — HOSPITAL ENCOUNTER (OUTPATIENT)
Facility: HOSPITAL | Age: 59
Discharge: HOME | End: 2024-09-20
Admitting: FAMILY MEDICINE
Payer: COMMERCIAL

## 2024-09-20 DIAGNOSIS — Z12.2 SCREENING FOR LUNG CANCER: ICD-10-CM

## 2024-09-20 PROCEDURE — 71271 CT THORAX LUNG CANCER SCR C-: CPT

## 2025-03-18 ENCOUNTER — READMISSION MANAGEMENT (OUTPATIENT)
Dept: CALL CENTER | Facility: HOSPITAL | Age: 60
End: 2025-03-18
Payer: COMMERCIAL

## 2025-03-18 NOTE — OUTREACH NOTE
Prep Survey      Flowsheet Row Responses   [facility]od[facility] facility patient discharged from? Non-BH   Is LACE score < 7 ? Non-BH Discharge   Eligibility [facility]   Date of Discharge 03/17/25   Discharge Disposition Home or Self Care   Discharge diagnosis possible MI   Does the patient have one of the following disease processes/diagnoses(primary or secondary)? Acute MI (STEMI,NSTEMI)   Prep survey completed? Yes            Rola Boston Registered Nurse

## 2025-03-19 ENCOUNTER — TRANSITIONAL CARE MANAGEMENT TELEPHONE ENCOUNTER (OUTPATIENT)
Dept: CALL CENTER | Facility: HOSPITAL | Age: 60
End: 2025-03-19
Payer: COMMERCIAL

## 2025-03-19 NOTE — OUTREACH NOTE
Call Center TCM Note      Flowsheet Row Responses   [facility] [place] patient discharged from? Non-   Does the patient have one of the following disease processes/diagnoses(primary or secondary)? Other   TCM attempt successful? Yes   Call start time 0814   Call end time 0816   Discharge diagnosis possible MI   Meds reviewed with patient/caregiver? Yes   Is the patient having any side effects they believe may be caused by any medication additions or changes? No   Does the patient have all medications ordered at discharge? Yes   Is the patient taking all medications as directed (includes completed medication regime)? Yes   Comments HOSP DC FU appt 3/20/25 145 pm.   Does the patient have an appointment with their PCP within 7-14 days of discharge? Yes   Has home health visited the patient within 72 hours of discharge? N/A   Psychosocial issues? No   Did the patient receive a copy of their discharge instructions? Yes   Nursing interventions Reviewed instructions with patient   What is the patient's perception of their health status since discharge? Improving   Is the patient/caregiver able to teach back signs and symptoms related to disease process for when to call PCP? Yes   Is the patient/caregiver able to teach back signs and symptoms related to disease process for when to call 911? Yes   Is the patient/caregiver able to teach back the hierarchy of who to call/visit for symptoms/problems? PCP, Specialist, Home health nurse, Urgent Care, ED, 911 Yes   TCM call completed? Yes   Wrap up additional comments Pt doing well. no issues with cath site.   Call end time 0816            Elli Jean RN    3/19/2025, 08:16 EDT

## 2025-03-20 ENCOUNTER — OFFICE VISIT (OUTPATIENT)
Dept: FAMILY MEDICINE CLINIC | Facility: CLINIC | Age: 60
End: 2025-03-20
Payer: COMMERCIAL

## 2025-03-20 VITALS
RESPIRATION RATE: 16 BRPM | BODY MASS INDEX: 26.31 KG/M2 | DIASTOLIC BLOOD PRESSURE: 80 MMHG | TEMPERATURE: 98.2 F | HEART RATE: 65 BPM | SYSTOLIC BLOOD PRESSURE: 116 MMHG | WEIGHT: 205 LBS | OXYGEN SATURATION: 98 % | HEIGHT: 74 IN

## 2025-03-20 DIAGNOSIS — I21.4 NON-STEMI (NON-ST ELEVATED MYOCARDIAL INFARCTION): Primary | ICD-10-CM

## 2025-03-20 DIAGNOSIS — F41.9 ANXIETY: ICD-10-CM

## 2025-03-20 DIAGNOSIS — R00.2 PALPITATION: ICD-10-CM

## 2025-03-20 DIAGNOSIS — E78.5 DYSLIPIDEMIA: ICD-10-CM

## 2025-03-20 RX ORDER — METOPROLOL SUCCINATE 25 MG/1
12.5 TABLET, EXTENDED RELEASE ORAL DAILY
COMMUNITY
Start: 2025-03-18

## 2025-03-20 RX ORDER — LOSARTAN POTASSIUM 25 MG/1
25 TABLET ORAL DAILY
COMMUNITY
Start: 2025-03-18 | End: 2025-06-16

## 2025-03-20 RX ORDER — ATORVASTATIN CALCIUM 80 MG/1
80 TABLET, FILM COATED ORAL DAILY
COMMUNITY
Start: 2025-03-17

## 2025-03-20 RX ORDER — PRASUGREL 10 MG/1
10 TABLET, FILM COATED ORAL DAILY
COMMUNITY
Start: 2025-03-18

## 2025-03-20 RX ORDER — HYDROCODONE BITARTRATE AND ACETAMINOPHEN 5; 325 MG/1; MG/1
1 TABLET ORAL AS NEEDED
COMMUNITY
Start: 2025-03-17 | End: 2025-04-16

## 2025-03-20 RX ORDER — PANTOPRAZOLE SODIUM 40 MG/1
40 TABLET, DELAYED RELEASE ORAL DAILY
COMMUNITY
Start: 2025-03-17

## 2025-03-20 RX ORDER — ASPIRIN 81 MG/1
81 TABLET ORAL DAILY
COMMUNITY
Start: 2025-03-18

## 2025-03-20 NOTE — PROGRESS NOTES
"Transitional Care Follow Up Visit  Subjective     Luis Fernando Ramirez Jr. is a 59 y.o. male who presents for a transitional care management visit.    Within 48 business hours after discharge our office contacted him via telephone to coordinate his care and needs.      I reviewed and discussed the details of that call along with the discharge summary, hospital problems, inpatient lab results, inpatient diagnostic studies, and consultation reports with Luis Fernando.     Current outpatient and discharge medications have been reconciled for the patient.    Visit Vitals  /80   Pulse 65   Temp 98.2 °F (36.8 °C)   Resp 16   Ht 188 cm (74.02\")   Wt 93 kg (205 lb)   SpO2 98%   BMI 26.31 kg/m²            3/18/2025    11:19 AM   Date of TCM Phone Call   [facility]   Date of Discharge 3/17/2025   Discharge Disposition Home or Self Care     Risk for Readmission (LACE) No data recorded    History of Present Illness     Course During Hospital Stay:      Pt went to ER due to worsening chest pain. He was found to have ST changes on EKG along with elevated troponin . He underwent cardiac catheterization and had PCI performed to RCA. After cardiac catheterization there was some concern for stent thrombosis was treated with prasugrel, after having issues with ticagrelor (dyspnea). He was also treated with Aggrastat. Patient was d/c home and advised to take aspirin/prasugrel along with high intensity statin.   He will start cardiac Rehab next week.   Will follow with cardio as well.   He stopped smoking , starts eating more HD     Palpitation ; sx on/off. Inderal was switched to BB.       HLD; starts eats HD, doing exercise   Last labs showed low HDL, elevated TG and LDL    Anxiety; no new concern       Lab Results   Component Value Date    CHLPL 217 (H) 08/28/2024    TRIG 193 (H) 08/28/2024    HDL 26 (L) 08/28/2024     (H) 08/28/2024     Lab Results   Component Value Date    GLUCOSE 96 01/18/2024    BUN 13 01/18/2024    " CREATININE 1.27 01/18/2024     01/18/2024    K 4.6 01/18/2024     01/18/2024    CALCIUM 9.5 01/18/2024    PROTEINTOT 6.4 01/18/2024    ALBUMIN 4.3 01/18/2024    ALT 17 01/18/2024    AST 14 01/18/2024    ALKPHOS 85 01/18/2024    BILITOT 0.2 01/18/2024    GLOB 2.1 01/18/2024    AGRATIO 2.0 01/18/2024    BCR 10.2 01/18/2024    ANIONGAP 9 07/25/2022    EGFR 66.3 01/18/2024     Lab Results   Component Value Date    HGBA1C 6.20 (H) 02/07/2020            The following portions of the patient's history were reviewed and updated as appropriate: allergies, current medications, past family history, past medical history, past social history, past surgical history, and problem list.    Past Medical History:   Diagnosis Date    Asthma     Chest pain     Familial hypercholesterolemia     Heart murmur     Hypertension     Irregular heart beats     PVC (premature ventricular contraction)       History reviewed. No pertinent surgical history.   Family History   Problem Relation Age of Onset    Heart attack Maternal Grandfather     Heart disease Father       Social History     Socioeconomic History    Marital status:    Tobacco Use    Smoking status: Every Day     Current packs/day: 1.00     Average packs/day: 1 pack/day for 38.2 years (38.2 ttl pk-yrs)     Types: Cigarettes     Start date: 1/1/1987     Passive exposure: Past    Smokeless tobacco: Current    Tobacco comments:     20+   Vaping Use    Vaping status: Never Used   Substance and Sexual Activity    Alcohol use: Yes     Alcohol/week: 1.0 standard drink of alcohol     Types: 1 Cans of beer per week     Comment: daily caffiene    Drug use: Yes     Types: Marijuana     Comment: Once monthly    Sexual activity: Yes     Partners: Female     Birth control/protection: None, Partner of same sex        Wt Readings from Last 3 Encounters:   03/20/25 93 kg (205 lb)   08/28/24 94.8 kg (209 lb)   04/16/24 96.2 kg (212 lb)     Temp Readings from Last 3 Encounters:    03/20/25 98.2 °F (36.8 °C)   08/28/24 97.3 °F (36.3 °C)   04/16/24 97.7 °F (36.5 °C)     BP Readings from Last 3 Encounters:   03/20/25 116/80   08/28/24 118/80   04/16/24 122/80     Pulse Readings from Last 3 Encounters:   03/20/25 65   08/28/24 75   04/16/24 65         Review of Systems   Cardiovascular:  Negative for chest pain and leg swelling.       Objective   Physical Exam  Vitals and nursing note reviewed.   Constitutional:       General: He is not in acute distress.  Cardiovascular:      Rate and Rhythm: Normal rate and regular rhythm.      Heart sounds: Normal heart sounds. No murmur heard.  Pulmonary:      Effort: Pulmonary effort is normal. No respiratory distress.      Breath sounds: Normal breath sounds. No stridor. No wheezing or rhonchi.   Neurological:      Mental Status: He is alert and oriented to person, place, and time.   Psychiatric:         Mood and Affect: Mood normal.         Behavior: Behavior normal.         Thought Content: Thought content normal.         Assessment & Plan   Diagnoses and all orders for this visit:    1. Non-STEMI (non-ST elevated myocardial infarction) (Primary)  Comments:  stable, close monitoring  continue current Rx  discussed diet    2. Palpitation  Comments:  no new concern   on BB    3. Dyslipidemia  Comments:  discussed in length on diet , regular exercise   will check labs next OV    4. Anxiety  Comments:  stable          Current Outpatient Medications:     aspirin 81 MG EC tablet, Take 1 tablet by mouth Daily., Disp: , Rfl:     atorvastatin (LIPITOR) 80 MG tablet, Take 1 tablet by mouth Daily., Disp: , Rfl:     HYDROcodone-acetaminophen (NORCO) 5-325 MG per tablet, Take 1 tablet by mouth As Needed., Disp: , Rfl:     losartan (COZAAR) 25 MG tablet, Take 1 tablet by mouth Daily., Disp: , Rfl:     metoprolol succinate XL (TOPROL-XL) 25 MG 24 hr tablet, Take 0.5 tablets by mouth Daily., Disp: , Rfl:     pantoprazole (PROTONIX) 40 MG EC tablet, Take 1 tablet by  mouth Daily., Disp: , Rfl:     prasugrel (EFFIENT) 10 MG tablet, Take 1 tablet by mouth Daily., Disp: , Rfl:         I have fully discussed the nature of the medical condition(s) risks, complications, management, safe and proper use of medications.   She stated no allergy to the above prescribed medication.  I have discussed the SIDE EFFECT OF MEDICATION and importance TO report any side effect , the patient expressed good understanding.  Encouraged medication compliance and the importance of keeping scheduled follow up appointments with me and any other providers.    Patient instructed to follow up with our office for results on any labs/imaging ordered during this visit.    Home care discussed  All questions answered  Patient verbalizes understanding and agrees to treatment plan.

## 2025-05-21 ENCOUNTER — OFFICE VISIT (OUTPATIENT)
Dept: FAMILY MEDICINE CLINIC | Facility: CLINIC | Age: 60
End: 2025-05-21
Payer: COMMERCIAL

## 2025-05-21 VITALS
WEIGHT: 200 LBS | HEIGHT: 74 IN | HEART RATE: 70 BPM | TEMPERATURE: 97.8 F | DIASTOLIC BLOOD PRESSURE: 76 MMHG | SYSTOLIC BLOOD PRESSURE: 116 MMHG | RESPIRATION RATE: 16 BRPM | BODY MASS INDEX: 25.67 KG/M2 | OXYGEN SATURATION: 97 %

## 2025-05-21 DIAGNOSIS — I21.4 NON-STEMI (NON-ST ELEVATED MYOCARDIAL INFARCTION): ICD-10-CM

## 2025-05-21 DIAGNOSIS — E78.5 DYSLIPIDEMIA: ICD-10-CM

## 2025-05-21 DIAGNOSIS — R73.02 IGT (IMPAIRED GLUCOSE TOLERANCE): ICD-10-CM

## 2025-05-21 DIAGNOSIS — Z00.00 ENCOUNTER FOR ANNUAL PHYSICAL EXAM: Primary | ICD-10-CM

## 2025-05-21 DIAGNOSIS — I10 HTN (HYPERTENSION), BENIGN: ICD-10-CM

## 2025-05-21 DIAGNOSIS — K59.04 CHRONIC IDIOPATHIC CONSTIPATION: ICD-10-CM

## 2025-05-21 DIAGNOSIS — Z12.11 SCREENING FOR COLON CANCER: ICD-10-CM

## 2025-05-21 PROBLEM — R07.89 CHEST PAIN, ATYPICAL: Status: RESOLVED | Noted: 2020-03-03 | Resolved: 2025-05-21

## 2025-05-21 PROBLEM — R73.09 ELEVATED GLUCOSE: Status: RESOLVED | Noted: 2018-07-13 | Resolved: 2025-05-21

## 2025-05-21 LAB
EXPIRATION DATE: ABNORMAL
HBA1C MFR BLD: 5.9 % (ref 4.5–5.7)
Lab: ABNORMAL

## 2025-05-21 NOTE — PROGRESS NOTES
Patient Care Team:  Palma Barker MD as PCP - General (Urgent Care)     Chief complaint: Patient is in today for a physical          Patient is a 60 y.o. male who presents for his yearly physical exam.     HPI     Doing well . Eating HD.  Doing cardiac rehab 3 x /week.    HLD; Labs done 1 month ago by [place] [facility], his LDL still above goal. Statin was d/c, he is on Repatha.   He is c/o constipation.   Immunizations: reviewed and discussed.           Labs has been ordered and personally/independently interpreted , discussed with pt     Lab Results   Component Value Date    HGBA1C 5.9 (A) 05/21/2025         Lab Results   Component Value Date    CHLPL 217 (H) 08/28/2024    TRIG 193 (H) 08/28/2024    HDL 26 (L) 08/28/2024     (H) 08/28/2024     Lab Results   Component Value Date    GLUCOSE 96 01/18/2024    BUN 13 01/18/2024    CREATININE 1.27 01/18/2024     01/18/2024    K 4.6 01/18/2024     01/18/2024    CALCIUM 9.5 01/18/2024    PROTEINTOT 6.4 01/18/2024    ALBUMIN 4.3 01/18/2024    ALT 17 01/18/2024    AST 14 01/18/2024    ALKPHOS 85 01/18/2024    BILITOT 0.2 01/18/2024    GLOB 2.1 01/18/2024    AGRATIO 2.0 01/18/2024    BCR 10.2 01/18/2024    ANIONGAP 9 07/25/2022    EGFR 66.3 01/18/2024     Lab Results   Component Value Date    WBC 10.31 01/18/2024    HGB 15.0 01/18/2024    HCT 44.9 01/18/2024    MCV 89.1 01/18/2024     01/18/2024     Lab Results   Component Value Date    PSA 1.750 08/28/2024    PSA 0.758 02/07/2020    PSA 0.919 04/15/2019             Health maintenance/lifestyle:  Immunization History   Administered Date(s) Administered    COVID-19 (MODERNA) 12YRS+ (SPIKEVAX) 09/17/2023, 10/05/2024    COVID-19 (MODERNA) 1st,2nd,3rd Dose Monovalent 01/17/2021, 02/14/2021    COVID-19 (MODERNA) BIVALENT 12+YRS 09/14/2022    COVID-19 (MODERNA) Monovalent Original Booster 10/22/2021, 04/18/2022    Fluzone  >6mos 10/05/2024    Fluzone (or Fluarix & Flulaval for VFC) >6mos  09/25/2019, 09/14/2022    Influenza Injectable Mdck Pf Quad 09/16/2023    Influenza, Unspecified 10/04/2020    Pneumococcal Conjugate 13-Valent (PCV13) 10/23/2017    flucelvax quad pfs =>4 YRS 10/04/2020         HM;  Colorectal Screening:  due           Social History     Tobacco Use   Smoking Status Every Day    Current packs/day: 1.00    Average packs/day: 1 pack/day for 38.4 years (38.4 ttl pk-yrs)    Types: Cigarettes    Start date: 1/1/1987    Passive exposure: Past   Smokeless Tobacco Current   Tobacco Comments    20+     Social History     Substance and Sexual Activity   Alcohol Use Yes    Alcohol/week: 1.0 standard drink of alcohol    Types: 1 Cans of beer per week    Comment: daily caffiene         Review of Systems   Cardiovascular:  Negative for chest pain.   Gastrointestinal:  Positive for constipation.         History  Past Medical History:   Diagnosis Date    Asthma     Chest pain     Familial hypercholesterolemia     Heart murmur     Hypertension     Irregular heart beats     PVC (premature ventricular contraction)       History reviewed. No pertinent surgical history.   No Known Allergies   Family History   Problem Relation Age of Onset    Heart attack Maternal Grandfather     Heart disease Father      Social History     Socioeconomic History    Marital status:    Tobacco Use    Smoking status: Every Day     Current packs/day: 1.00     Average packs/day: 1 pack/day for 38.4 years (38.4 ttl pk-yrs)     Types: Cigarettes     Start date: 1/1/1987     Passive exposure: Past    Smokeless tobacco: Current    Tobacco comments:     20+   Vaping Use    Vaping status: Never Used   Substance and Sexual Activity    Alcohol use: Yes     Alcohol/week: 1.0 standard drink of alcohol     Types: 1 Cans of beer per week     Comment: daily caffiene    Drug use: Yes     Types: Marijuana     Comment: Once monthly    Sexual activity: Yes     Partners: Female     Birth control/protection: None, Partner of same sex     "    Current Outpatient Medications:     Evolocumab (REPATHA) solution auto-injector SureClick injection, Inject 1 mL under the skin into the appropriate area as directed Every 14 (Fourteen) Days., Disp: , Rfl:     losartan (COZAAR) 25 MG tablet, Take 1 tablet by mouth Daily., Disp: , Rfl:     metoprolol succinate XL (TOPROL-XL) 25 MG 24 hr tablet, Take 0.5 tablets by mouth Daily., Disp: , Rfl:     pantoprazole (PROTONIX) 40 MG EC tablet, Take 1 tablet by mouth Daily., Disp: , Rfl:     prasugrel (EFFIENT) 10 MG tablet, Take 1 tablet by mouth Daily., Disp: , Rfl:                   /76   Pulse 70   Temp 97.8 °F (36.6 °C)   Resp 16   Ht 188 cm (74.02\")   Wt 90.7 kg (200 lb)   SpO2 97%   BMI 25.67 kg/m²       Physical Exam  Vitals and nursing note reviewed.   Constitutional:       General: He is not in acute distress.     Appearance: He is not toxic-appearing.   HENT:      Nose: No congestion or rhinorrhea.   Eyes:      Conjunctiva/sclera: Conjunctivae normal.   Cardiovascular:      Rate and Rhythm: Normal rate and regular rhythm.      Heart sounds: Normal heart sounds. No murmur heard.  Pulmonary:      Effort: Pulmonary effort is normal. No respiratory distress.      Breath sounds: Normal breath sounds. No stridor. No wheezing or rhonchi.   Abdominal:      General: Bowel sounds are normal. There is no distension.      Palpations: Abdomen is soft. There is no mass.      Tenderness: There is no abdominal tenderness. There is no guarding or rebound.      Hernia: No hernia is present.   Musculoskeletal:      Cervical back: Neck supple.      Right lower leg: No edema.      Left lower leg: No edema.   Skin:     General: Skin is warm.      Coloration: Skin is not jaundiced or pale.   Neurological:      Mental Status: He is alert and oriented to person, place, and time.   Psychiatric:         Mood and Affect: Mood normal.         Behavior: Behavior normal.         Thought Content: Thought content normal.            "        Diagnoses and all orders for this visit:    1. Encounter for annual physical exam (Primary)    2. HTN (hypertension), benign    3. Dyslipidemia    4. Non-STEMI (non-ST elevated myocardial infarction)    5. IGT (impaired glucose tolerance)  -     POC Glycosylated Hemoglobin (Hb A1C)    6. Screening for colon cancer  -     Ambulatory Referral For Screening Colonoscopy    7. Chronic idiopathic constipation  Comments:  discussed diet, hydration, OTC supplem      -Age and sex appropriate physical exam performed and documented.   -Updated past medical, family, social and surgical histories as well as allergies and care team list.   -Addressed care gaps listed in the medical record.  -advised to eat healthy diet, do daily exercise   - discussed and updates preventive screening measures       Follow up: Return in about 6 months (around 11/21/2025) for follow up on current illness.  Plan of care discussed with pt. They verbalized understanding and agreement.     Palma Barker MD   5/21/2025   10:28 EDT

## 2025-06-26 DIAGNOSIS — Z12.2 SCREENING FOR LUNG CANCER: Primary | ICD-10-CM

## 2025-07-22 ENCOUNTER — PATIENT MESSAGE (OUTPATIENT)
Dept: FAMILY MEDICINE CLINIC | Facility: CLINIC | Age: 60
End: 2025-07-22
Payer: COMMERCIAL

## 2025-07-22 RX ORDER — SILDENAFIL 50 MG/1
50 TABLET, FILM COATED ORAL DAILY PRN
Qty: 30 TABLET | Refills: 1 | Status: SHIPPED | OUTPATIENT
Start: 2025-07-22